# Patient Record
Sex: MALE | Race: WHITE | NOT HISPANIC OR LATINO | Employment: OTHER | URBAN - METROPOLITAN AREA
[De-identification: names, ages, dates, MRNs, and addresses within clinical notes are randomized per-mention and may not be internally consistent; named-entity substitution may affect disease eponyms.]

---

## 2023-03-14 DIAGNOSIS — M06.9 RHEUMATOID ARTHRITIS, INVOLVING UNSPECIFIED SITE, UNSPECIFIED WHETHER RHEUMATOID FACTOR PRESENT (MULTI): Primary | ICD-10-CM

## 2023-03-14 DIAGNOSIS — M15.9 OSTEOARTHRITIS OF MULTIPLE JOINTS, UNSPECIFIED OSTEOARTHRITIS TYPE: ICD-10-CM

## 2023-03-14 DIAGNOSIS — M19.079 ANKLE ARTHRITIS: ICD-10-CM

## 2023-03-14 PROBLEM — E66.3 OVERWEIGHT WITH BODY MASS INDEX (BMI) 25.0-29.9: Status: RESOLVED | Noted: 2023-03-14 | Resolved: 2023-03-14

## 2023-03-14 PROBLEM — K11.20 SIALADENITIS: Status: ACTIVE | Noted: 2023-03-14

## 2023-03-14 PROBLEM — D84.9 IMMUNOCOMPROMISED PATIENT (MULTI): Status: ACTIVE | Noted: 2023-03-14

## 2023-03-14 PROBLEM — G47.00 INSOMNIA: Status: ACTIVE | Noted: 2023-03-14

## 2023-03-14 PROBLEM — N52.9 MALE ERECTILE DISORDER OF ORGANIC ORIGIN: Status: ACTIVE | Noted: 2023-03-14

## 2023-03-14 PROBLEM — M11.20 CPDD (CALCIUM PYROPHOSPHATE DEPOSITION DISEASE): Status: ACTIVE | Noted: 2023-03-14

## 2023-03-14 PROBLEM — M19.039 WRIST ARTHRITIS: Status: ACTIVE | Noted: 2023-03-14

## 2023-03-14 PROBLEM — G25.81 RESTLESS LEGS SYNDROME: Status: ACTIVE | Noted: 2023-03-14

## 2023-03-14 PROBLEM — Z79.52 CURRENT CHRONIC USE OF SYSTEMIC STEROIDS: Status: ACTIVE | Noted: 2023-03-14

## 2023-03-14 PROBLEM — R06.02 SHORTNESS OF BREATH AT REST: Status: ACTIVE | Noted: 2023-03-14

## 2023-03-14 PROBLEM — R79.9 ABNORMAL BLOOD CHEMISTRY: Status: ACTIVE | Noted: 2023-03-14

## 2023-03-14 PROBLEM — R76.12 REACTION TO QUANTIFERON-TB TEST: Status: ACTIVE | Noted: 2023-03-14

## 2023-03-14 PROBLEM — I10 HTN (HYPERTENSION): Status: ACTIVE | Noted: 2023-03-14

## 2023-03-14 PROBLEM — C61 PROSTATE CANCER (MULTI): Status: ACTIVE | Noted: 2023-03-14

## 2023-03-14 PROBLEM — F17.210 NICOTINE DEPENDENCE, CIGARETTES, UNCOMPLICATED: Status: ACTIVE | Noted: 2023-03-14

## 2023-03-14 RX ORDER — ESCITALOPRAM OXALATE 10 MG/1
1 TABLET ORAL DAILY
COMMUNITY
Start: 2023-02-15 | End: 2023-10-13 | Stop reason: ALTCHOICE

## 2023-03-14 RX ORDER — ABATACEPT 50 MG/.4ML
INJECTION, SOLUTION SUBCUTANEOUS
COMMUNITY
Start: 2021-12-07 | End: 2023-10-13 | Stop reason: ALTCHOICE

## 2023-03-14 RX ORDER — METHOTREXATE 2.5 MG/1
TABLET ORAL
COMMUNITY
End: 2023-03-14 | Stop reason: SDUPTHER

## 2023-03-14 RX ORDER — METHOTREXATE 2.5 MG/1
TABLET ORAL
Qty: 40 TABLET | Refills: 0 | Status: SHIPPED | OUTPATIENT
Start: 2023-03-14 | End: 2023-04-17

## 2023-03-14 RX ORDER — FOLIC ACID 1 MG/1
1 TABLET ORAL DAILY
COMMUNITY
Start: 2017-09-18

## 2023-03-14 RX ORDER — MULTIVIT-MIN/IRON/FOLIC ACID/K 18-600-40
1 CAPSULE ORAL DAILY
COMMUNITY
Start: 2021-05-04

## 2023-03-14 RX ORDER — NABUMETONE 750 MG/1
1 TABLET, FILM COATED ORAL 2 TIMES DAILY
COMMUNITY
Start: 2023-02-13 | End: 2024-04-12 | Stop reason: ALTCHOICE

## 2023-04-17 DIAGNOSIS — M06.9 RHEUMATOID ARTHRITIS, INVOLVING UNSPECIFIED SITE, UNSPECIFIED WHETHER RHEUMATOID FACTOR PRESENT (MULTI): ICD-10-CM

## 2023-04-17 RX ORDER — METHOTREXATE 2.5 MG/1
TABLET ORAL
Qty: 40 TABLET | Refills: 0 | Status: SHIPPED | OUTPATIENT
Start: 2023-04-17 | End: 2023-05-30

## 2023-05-09 LAB
ALANINE AMINOTRANSFERASE (SGPT) (U/L) IN SER/PLAS: 30 U/L (ref 10–52)
ALBUMIN (G/DL) IN SER/PLAS: 3.8 G/DL (ref 3.4–5)
ALKALINE PHOSPHATASE (U/L) IN SER/PLAS: 112 U/L (ref 33–136)
ASPARTATE AMINOTRANSFERASE (SGOT) (U/L) IN SER/PLAS: 28 U/L (ref 9–39)
BASOPHILS (10*3/UL) IN BLOOD BY AUTOMATED COUNT: 0.03 X10E9/L (ref 0–0.1)
BASOPHILS/100 LEUKOCYTES IN BLOOD BY AUTOMATED COUNT: 0.8 % (ref 0–2)
BILIRUBIN DIRECT (MG/DL) IN SER/PLAS: 0.1 MG/DL (ref 0–0.3)
BILIRUBIN TOTAL (MG/DL) IN SER/PLAS: 0.5 MG/DL (ref 0–1.2)
C REACTIVE PROTEIN (MG/L) IN SER/PLAS: 0.19 MG/DL
CHOLESTEROL (MG/DL) IN SER/PLAS: 176 MG/DL (ref 0–199)
CHOLESTEROL IN HDL (MG/DL) IN SER/PLAS: 62.4 MG/DL
CHOLESTEROL/HDL RATIO: 2.8
CREATININE (MG/DL) IN SER/PLAS: 0.97 MG/DL (ref 0.5–1.3)
EOSINOPHILS (10*3/UL) IN BLOOD BY AUTOMATED COUNT: 0.08 X10E9/L (ref 0–0.7)
EOSINOPHILS/100 LEUKOCYTES IN BLOOD BY AUTOMATED COUNT: 2 % (ref 0–6)
ERYTHROCYTE DISTRIBUTION WIDTH (RATIO) BY AUTOMATED COUNT: 15.2 % (ref 11.5–14.5)
ERYTHROCYTE MEAN CORPUSCULAR HEMOGLOBIN CONCENTRATION (G/DL) BY AUTOMATED: 33.1 G/DL (ref 32–36)
ERYTHROCYTE MEAN CORPUSCULAR VOLUME (FL) BY AUTOMATED COUNT: 94 FL (ref 80–100)
ERYTHROCYTES (10*6/UL) IN BLOOD BY AUTOMATED COUNT: 4.49 X10E12/L (ref 4.5–5.9)
GFR MALE: 85 ML/MIN/1.73M2
HEMATOCRIT (%) IN BLOOD BY AUTOMATED COUNT: 42.3 % (ref 41–52)
HEMOGLOBIN (G/DL) IN BLOOD: 14 G/DL (ref 13.5–17.5)
IMMATURE GRANULOCYTES/100 LEUKOCYTES IN BLOOD BY AUTOMATED COUNT: 0.8 % (ref 0–0.9)
LDL: 100 MG/DL (ref 0–99)
LEUKOCYTES (10*3/UL) IN BLOOD BY AUTOMATED COUNT: 3.9 X10E9/L (ref 4.4–11.3)
LYMPHOCYTES (10*3/UL) IN BLOOD BY AUTOMATED COUNT: 1.38 X10E9/L (ref 1.2–4.8)
LYMPHOCYTES/100 LEUKOCYTES IN BLOOD BY AUTOMATED COUNT: 35 % (ref 13–44)
MONOCYTES (10*3/UL) IN BLOOD BY AUTOMATED COUNT: 0.39 X10E9/L (ref 0.1–1)
MONOCYTES/100 LEUKOCYTES IN BLOOD BY AUTOMATED COUNT: 9.9 % (ref 2–10)
NEUTROPHILS (10*3/UL) IN BLOOD BY AUTOMATED COUNT: 2.03 X10E9/L (ref 1.2–7.7)
NEUTROPHILS/100 LEUKOCYTES IN BLOOD BY AUTOMATED COUNT: 51.5 % (ref 40–80)
NRBC (PER 100 WBCS) BY AUTOMATED COUNT: 0 /100 WBC (ref 0–0)
PLATELETS (10*3/UL) IN BLOOD AUTOMATED COUNT: 82 X10E9/L (ref 150–450)
PROTEIN TOTAL: 6.9 G/DL (ref 6.4–8.2)
TESTOSTERONE (NG/DL) IN SER/PLAS: 1258 NG/DL (ref 240–1000)
THYROTROPIN (MIU/L) IN SER/PLAS BY DETECTION LIMIT <= 0.05 MIU/L: 1.69 MIU/L (ref 0.44–3.98)
TRIGLYCERIDE (MG/DL) IN SER/PLAS: 69 MG/DL (ref 0–149)
VLDL: 14 MG/DL (ref 0–40)

## 2023-05-11 PROBLEM — G31.84 MILD COGNITIVE IMPAIRMENT: Status: ACTIVE | Noted: 2023-05-11

## 2023-05-11 PROBLEM — Z00.00 MEDICARE ANNUAL WELLNESS VISIT, SUBSEQUENT: Status: ACTIVE | Noted: 2023-05-11

## 2023-05-29 DIAGNOSIS — M06.9 RHEUMATOID ARTHRITIS, INVOLVING UNSPECIFIED SITE, UNSPECIFIED WHETHER RHEUMATOID FACTOR PRESENT (MULTI): ICD-10-CM

## 2023-05-30 LAB
BURR CELLS PRESENCE IN BLOOD BY LIGHT MICROSCOPY: NORMAL
ERYTHROCYTE DISTRIBUTION WIDTH (RATIO) BY AUTOMATED COUNT: 14.7 % (ref 11.5–14.5)
ERYTHROCYTE MEAN CORPUSCULAR HEMOGLOBIN CONCENTRATION (G/DL) BY AUTOMATED: 32.5 G/DL (ref 32–36)
ERYTHROCYTE MEAN CORPUSCULAR VOLUME (FL) BY AUTOMATED COUNT: 96 FL (ref 80–100)
ERYTHROCYTES (10*6/UL) IN BLOOD BY AUTOMATED COUNT: 4.5 X10E12/L (ref 4.5–5.9)
HEMATOCRIT (%) IN BLOOD BY AUTOMATED COUNT: 43.4 % (ref 41–52)
HEMOGLOBIN (G/DL) IN BLOOD: 14.1 G/DL (ref 13.5–17.5)
LEUKOCYTES (10*3/UL) IN BLOOD BY AUTOMATED COUNT: 7.1 X10E9/L (ref 4.4–11.3)
PLATELETS (10*3/UL) IN BLOOD AUTOMATED COUNT: 130 X10E9/L (ref 150–450)
POLYCHROMASIA IN BLOOD BY LIGHT MICROSCOPY: NORMAL
RBC MORPHOLOGY IN BLOOD: NORMAL
SCHISTOCYTES (PRESENCE) IN BLOOD BY LIGHT MICROSCOPY: NORMAL

## 2023-05-30 RX ORDER — METHOTREXATE 2.5 MG/1
TABLET ORAL
Qty: 40 TABLET | Refills: 3 | Status: SHIPPED | OUTPATIENT
Start: 2023-05-30 | End: 2024-05-08 | Stop reason: SDUPTHER

## 2023-06-13 PROBLEM — R79.9 ABNORMAL BLOOD CHEMISTRY: Status: RESOLVED | Noted: 2023-03-14 | Resolved: 2023-06-13

## 2023-06-13 PROBLEM — R41.3 MEMORY LOSS: Status: RESOLVED | Noted: 2023-06-13 | Resolved: 2023-06-13

## 2023-06-13 PROBLEM — M25.531 RIGHT WRIST PAIN: Status: RESOLVED | Noted: 2023-06-13 | Resolved: 2023-06-13

## 2023-06-13 PROBLEM — R27.0 ATAXIA: Status: RESOLVED | Noted: 2023-06-13 | Resolved: 2023-06-13

## 2023-06-13 PROBLEM — G45.9 TRANSIENT ISCHEMIC ATTACK: Status: RESOLVED | Noted: 2023-06-13 | Resolved: 2023-06-13

## 2023-10-13 ENCOUNTER — OFFICE VISIT (OUTPATIENT)
Dept: PRIMARY CARE | Facility: CLINIC | Age: 68
End: 2023-10-13
Payer: MEDICARE

## 2023-10-13 VITALS
HEART RATE: 62 BPM | BODY MASS INDEX: 33.99 KG/M2 | SYSTOLIC BLOOD PRESSURE: 102 MMHG | WEIGHT: 217 LBS | OXYGEN SATURATION: 94 % | DIASTOLIC BLOOD PRESSURE: 68 MMHG | RESPIRATION RATE: 16 BRPM | TEMPERATURE: 98.2 F

## 2023-10-13 DIAGNOSIS — Z23 NEED FOR VACCINATION: ICD-10-CM

## 2023-10-13 DIAGNOSIS — K21.00 GASTROESOPHAGEAL REFLUX DISEASE WITH ESOPHAGITIS WITHOUT HEMORRHAGE: Primary | ICD-10-CM

## 2023-10-13 DIAGNOSIS — J32.9 RHINOSINUSITIS: ICD-10-CM

## 2023-10-13 PROCEDURE — G0008 ADMIN INFLUENZA VIRUS VAC: HCPCS | Performed by: FAMILY MEDICINE

## 2023-10-13 PROCEDURE — 4004F PT TOBACCO SCREEN RCVD TLK: CPT | Performed by: FAMILY MEDICINE

## 2023-10-13 PROCEDURE — 90662 IIV NO PRSV INCREASED AG IM: CPT | Performed by: FAMILY MEDICINE

## 2023-10-13 PROCEDURE — 1126F AMNT PAIN NOTED NONE PRSNT: CPT | Performed by: FAMILY MEDICINE

## 2023-10-13 PROCEDURE — 99214 OFFICE O/P EST MOD 30 MIN: CPT | Performed by: FAMILY MEDICINE

## 2023-10-13 PROCEDURE — 3078F DIAST BP <80 MM HG: CPT | Performed by: FAMILY MEDICINE

## 2023-10-13 PROCEDURE — 3074F SYST BP LT 130 MM HG: CPT | Performed by: FAMILY MEDICINE

## 2023-10-13 PROCEDURE — 1159F MED LIST DOCD IN RCRD: CPT | Performed by: FAMILY MEDICINE

## 2023-10-13 RX ORDER — FOLIC ACID 1 MG/1
1 TABLET ORAL EVERY 24 HOURS
COMMUNITY
Start: 2017-09-18 | End: 2023-10-13 | Stop reason: ALTCHOICE

## 2023-10-13 RX ORDER — HYDROXYCHLOROQUINE SULFATE 200 MG/1
200 TABLET, FILM COATED ORAL 2 TIMES DAILY
COMMUNITY
Start: 2023-06-13

## 2023-10-13 RX ORDER — TRAZODONE HYDROCHLORIDE 50 MG/1
50 TABLET ORAL NIGHTLY
COMMUNITY

## 2023-10-13 RX ORDER — OMEPRAZOLE 40 MG/1
40 CAPSULE, DELAYED RELEASE ORAL
Qty: 30 CAPSULE | Refills: 1 | Status: SHIPPED | OUTPATIENT
Start: 2023-10-13 | End: 2023-12-11

## 2023-10-13 RX ORDER — FLUTICASONE PROPIONATE 50 MCG
1 SPRAY, SUSPENSION (ML) NASAL 2 TIMES DAILY
Qty: 48 G | Refills: 2 | Status: SHIPPED | OUTPATIENT
Start: 2023-10-13 | End: 2024-10-12

## 2023-10-13 RX ORDER — IPRATROPIUM BROMIDE 42 UG/1
1 SPRAY, METERED NASAL NIGHTLY PRN
Qty: 15 ML | Refills: 2 | Status: SHIPPED | OUTPATIENT
Start: 2023-10-13 | End: 2024-10-12

## 2023-10-13 ASSESSMENT — ENCOUNTER SYMPTOMS
OCCASIONAL FEELINGS OF UNSTEADINESS: 0
LOSS OF SENSATION IN FEET: 0
DEPRESSION: 0

## 2023-10-13 NOTE — PROGRESS NOTES
"Subjective   Patient ID: Dipesh Rousseau is a 68 y.o. male who presents for GI Problem (Acid Reflux, stomach tenderness 3out 10 pain).    Started 10/11/23. No blood.  But having increased heartburn for the past months.  Tums and norm seltzer.       1/4 ppd tobacco.       Objective   Visit Vitals  /68 (BP Location: Right arm, Patient Position: Sitting, BP Cuff Size: Adult)   Pulse 62   Temp 36.8 °C (98.2 °F) (Temporal)   Resp 16   Wt 98.4 kg (217 lb)   SpO2 94%   BMI 33.99 kg/m²   Smoking Status Every Day   BSA 2.16 m²      O: VSS AFEB Awake, Alert, NAD.  No intoxication, withdrawal, or sedation.  Chest CTA.  Heart RRR.  Ext no c/c/e.   No epigastric ttp.     Lab Results   Component Value Date    WBC 7.1 05/30/2023    HGB 14.1 05/30/2023    HCT 43.4 05/30/2023    MCV 96 05/30/2023     (L) 05/30/2023       Chemistry    Lab Results   Component Value Date/Time     (L) 11/18/2022 1133    K 4.1 11/18/2022 1133     11/18/2022 1133    CO2 25 11/18/2022 1133    BUN 18 11/18/2022 1133    CREATININE 0.97 05/09/2023 0943    Lab Results   Component Value Date/Time    CALCIUM 8.5 (L) 11/18/2022 1133    ALKPHOS 112 05/09/2023 0943    AST 28 05/09/2023 0943    ALT 30 05/09/2023 0943    BILITOT 0.5 05/09/2023 0943          Lab Results   Component Value Date    CHOL 176 05/09/2023     Lab Results   Component Value Date    HDL 62.4 05/09/2023     No results found for: \"LDLCALC\"  Lab Results   Component Value Date    TRIG 69 05/09/2023     No components found for: \"CHOLHDL\"   No results found for: \"HGBA1C\"    Assessment/Plan   Problem List Items Addressed This Visit       Gastroesophageal reflux disease with esophagitis without hemorrhage - Primary    Overview     10/13/23 handout provided to patient and wife.  Refer for EGD.   Start omeprazole 40 mg daily.   Halve nabumetone x 6-8 weeks until we get relief.          Relevant Medications    omeprazole (PriLOSEC) 40 mg DR capsule     Other Visit Diagnoses  "      Rhinosinusitis        Relevant Medications    fluticasone (Flonase) 50 mcg/actuation nasal spray    ipratropium (Atrovent) 42 mcg (0.06 %) nasal spray    Need for vaccination        Relevant Orders    Flu vaccine, quadrivalent, high-dose, preservative free, age 65y+ (FLUZONE)

## 2023-12-11 DIAGNOSIS — K21.00 GASTROESOPHAGEAL REFLUX DISEASE WITH ESOPHAGITIS WITHOUT HEMORRHAGE: ICD-10-CM

## 2023-12-11 RX ORDER — OMEPRAZOLE 40 MG/1
40 CAPSULE, DELAYED RELEASE ORAL
Qty: 30 CAPSULE | Refills: 11 | Status: SHIPPED | OUTPATIENT
Start: 2023-12-11

## 2023-12-21 ENCOUNTER — TELEPHONE (OUTPATIENT)
Dept: PRIMARY CARE | Facility: CLINIC | Age: 68
End: 2023-12-21
Payer: MEDICARE

## 2023-12-21 NOTE — TELEPHONE ENCOUNTER
Wife calling for radha, URI/Sinus concern. Dr. Bishop without openingings. Offered Dr. Wisdom, but he requires covid testing prior to in office with with these symptoms, declined to get OTC testing. Offered to send message to Dr. Bishop for next step, but made them aware, he was not in office until next day. Wife declined sending message, states she will take him to an urgent care or [facility].

## 2024-04-12 ENCOUNTER — OFFICE VISIT (OUTPATIENT)
Dept: PRIMARY CARE | Facility: CLINIC | Age: 69
End: 2024-04-12
Payer: MEDICARE

## 2024-04-12 VITALS
DIASTOLIC BLOOD PRESSURE: 70 MMHG | SYSTOLIC BLOOD PRESSURE: 130 MMHG | WEIGHT: 224 LBS | OXYGEN SATURATION: 97 % | HEART RATE: 59 BPM | TEMPERATURE: 97.9 F | RESPIRATION RATE: 16 BRPM | BODY MASS INDEX: 35.08 KG/M2

## 2024-04-12 DIAGNOSIS — M05.79 RHEUMATOID ARTHRITIS INVOLVING MULTIPLE SITES WITH POSITIVE RHEUMATOID FACTOR (MULTI): Primary | ICD-10-CM

## 2024-04-12 DIAGNOSIS — G31.84 MILD COGNITIVE IMPAIRMENT: ICD-10-CM

## 2024-04-12 DIAGNOSIS — I10 PRIMARY HYPERTENSION: ICD-10-CM

## 2024-04-12 DIAGNOSIS — Z23 NEED FOR VACCINATION: ICD-10-CM

## 2024-04-12 DIAGNOSIS — F17.210 NICOTINE DEPENDENCE, CIGARETTES, UNCOMPLICATED: ICD-10-CM

## 2024-04-12 DIAGNOSIS — Z00.00 MEDICARE ANNUAL WELLNESS VISIT, SUBSEQUENT: ICD-10-CM

## 2024-04-12 PROBLEM — K11.20 SIALADENITIS: Status: RESOLVED | Noted: 2023-03-14 | Resolved: 2024-04-12

## 2024-04-12 PROBLEM — Z79.52 CURRENT CHRONIC USE OF SYSTEMIC STEROIDS: Status: RESOLVED | Noted: 2023-03-14 | Resolved: 2024-04-12

## 2024-04-12 PROBLEM — K76.0 HEPATIC STEATOSIS: Status: ACTIVE | Noted: 2024-04-12

## 2024-04-12 PROCEDURE — 3078F DIAST BP <80 MM HG: CPT | Performed by: FAMILY MEDICINE

## 2024-04-12 PROCEDURE — 1159F MED LIST DOCD IN RCRD: CPT | Performed by: FAMILY MEDICINE

## 2024-04-12 PROCEDURE — 1170F FXNL STATUS ASSESSED: CPT | Performed by: FAMILY MEDICINE

## 2024-04-12 PROCEDURE — 90677 PCV20 VACCINE IM: CPT | Performed by: FAMILY MEDICINE

## 2024-04-12 PROCEDURE — 1123F ACP DISCUSS/DSCN MKR DOCD: CPT | Performed by: FAMILY MEDICINE

## 2024-04-12 PROCEDURE — 1158F ADVNC CARE PLAN TLK DOCD: CPT | Performed by: FAMILY MEDICINE

## 2024-04-12 PROCEDURE — G0439 PPPS, SUBSEQ VISIT: HCPCS | Performed by: FAMILY MEDICINE

## 2024-04-12 PROCEDURE — 3075F SYST BP GE 130 - 139MM HG: CPT | Performed by: FAMILY MEDICINE

## 2024-04-12 PROCEDURE — 99397 PER PM REEVAL EST PAT 65+ YR: CPT | Performed by: FAMILY MEDICINE

## 2024-04-12 PROCEDURE — 99406 BEHAV CHNG SMOKING 3-10 MIN: CPT | Performed by: FAMILY MEDICINE

## 2024-04-12 PROCEDURE — G0446 INTENS BEHAVE THER CARDIO DX: HCPCS | Performed by: FAMILY MEDICINE

## 2024-04-12 PROCEDURE — G0009 ADMIN PNEUMOCOCCAL VACCINE: HCPCS | Performed by: FAMILY MEDICINE

## 2024-04-12 PROCEDURE — 1160F RVW MEDS BY RX/DR IN RCRD: CPT | Performed by: FAMILY MEDICINE

## 2024-04-12 PROCEDURE — 99214 OFFICE O/P EST MOD 30 MIN: CPT | Performed by: FAMILY MEDICINE

## 2024-04-12 RX ORDER — NABUMETONE 750 MG/1
750 TABLET, FILM COATED ORAL 2 TIMES DAILY
Start: 2024-04-12 | End: 2025-04-12

## 2024-04-12 ASSESSMENT — ENCOUNTER SYMPTOMS
COUGH: 0
BLOOD IN STOOL: 0
PALPITATIONS: 0
FEVER: 0
HEMATURIA: 0
POLYDIPSIA: 0
NECK STIFFNESS: 0
HEADACHES: 0
BACK PAIN: 0
BRUISES/BLEEDS EASILY: 0
ADENOPATHY: 0
DYSURIA: 0
SORE THROAT: 0
EYE REDNESS: 0
WOUND: 0
RHINORRHEA: 0
EYE PAIN: 0
SHORTNESS OF BREATH: 0
CHILLS: 0
CONFUSION: 1
FATIGUE: 0
ARTHRALGIAS: 1
HALLUCINATIONS: 0
ABDOMINAL PAIN: 0

## 2024-04-12 ASSESSMENT — ACTIVITIES OF DAILY LIVING (ADL)
MANAGING_FINANCES: INDEPENDENT
TAKING_MEDICATION: INDEPENDENT
DRESSING: INDEPENDENT
GROCERY_SHOPPING: INDEPENDENT
DOING_HOUSEWORK: INDEPENDENT
BATHING: INDEPENDENT

## 2024-04-12 ASSESSMENT — PATIENT HEALTH QUESTIONNAIRE - PHQ9
1. LITTLE INTEREST OR PLEASURE IN DOING THINGS: NOT AT ALL
2. FEELING DOWN, DEPRESSED OR HOPELESS: NOT AT ALL
SUM OF ALL RESPONSES TO PHQ9 QUESTIONS 1 AND 2: 0

## 2024-04-12 NOTE — ASSESSMENT & PLAN NOTE
Per US 3/2024 at [facility]  Encouraged continued healthy diet -- initial goal to lose 10%, 25# to get down to 200#.

## 2024-04-12 NOTE — ASSESSMENT & PLAN NOTE
Hx Weakness/ataxia/confusion: No further episodse since fall 2022. Brain MRI normal. Labs normal except for transient elevation of CRP/ESR which have resolved. Neuro Dr Mcneal suspects TIA -- MRA, echo, zio monitor and eeg pending. Neuropsychologic testing with Dr Hughes revealed only MCI SLUMS 25/30 and suspected depression.     Fatigue and MCI: slowly improving over past 6 months -- able to work 3 days in a row now. Suspect that this is due to a stress reaction/MDD as diagnosed by Dr Hughes -- treat with lexapro (escitalopram) 10 mg qpm. If this relieves his symptoms then can forego further cardio/neurologic testing. Can check TSH and testosterone with next bloodwork.

## 2024-04-12 NOTE — PROGRESS NOTES
"Subjective   Patient ID: Dipesh Rousseau is a 69 y.o. male who presents for Follow-up (Urgent care).  And Medical [place] preventive exam and annual medicare wellness eval.     [facility] visit -- pain in left lateral rib.  Started on left flank and radiated to left lateral rib.   No cough, no sob.  No hematuria.      Had US showing steatosis and a single renal cyst.     Objective   Visit Vitals  /70 (BP Location: Left arm, Patient Position: Sitting, BP Cuff Size: Adult)   Pulse 59   Temp 36.6 °C (97.9 °F) (Temporal)   Resp 16   Wt 102 kg (224 lb)   SpO2 97%   BMI 35.08 kg/m²   Smoking Status Every Day   BSA 2.2 m²      O: VSS AFEB Awake, Alert, NAD.  No intoxication, withdrawal, or sedation.  Chest CTA.  Heart RRR.  Ext no c/c/e.    Healed vesicular lesion around left flank c/w with healed mild shingles.  Mild left lateral costocoondral margin.  No rebound or guarding.  Negative cva ttp.     Lab Results   Component Value Date    WBC 7.1 05/30/2023    HGB 14.1 05/30/2023    HCT 43.4 05/30/2023    MCV 96 05/30/2023     (L) 05/30/2023       Chemistry    Lab Results   Component Value Date/Time     (L) 11/18/2022 1133    K 4.1 11/18/2022 1133     11/18/2022 1133    CO2 25 11/18/2022 1133    BUN 18 11/18/2022 1133    CREATININE 0.97 05/09/2023 0943    Lab Results   Component Value Date/Time    CALCIUM 8.5 (L) 11/18/2022 1133    ALKPHOS 112 05/09/2023 0943    AST 28 05/09/2023 0943    ALT 30 05/09/2023 0943    BILITOT 0.5 05/09/2023 0943          Lab Results   Component Value Date    CHOL 176 05/09/2023     Lab Results   Component Value Date    HDL 62.4 05/09/2023     No results found for: \"LDLCALC\"  Lab Results   Component Value Date    TRIG 69 05/09/2023     No components found for: \"CHOLHDL\"   No results found for: \"HGBA1C\"    Assessment/Plan   Problem List Items Addressed This Visit       Nicotine dependence, cigarettes, uncomplicated    Overview     3 cigarettes a day.  " Pre-contemplative         Rheumatoid arthritis (Multi) - Primary    Overview     Dx 2019  Failed Humira (LFTs)  Failed Orencia (Fatigue)  12/13/23 [facility] RHEUM (Ray)  Plaquenil (HQN),  methotrexate down to 15 mg weekly.  Avoid Siria's d/t hx smoking.  Tolerating Rituximab    10/13/23 halve nabumetone due to gastritis/esophagitis         Medicare annual wellness visit, subsequent    Overview     4/12/24 Annual MM preventive exam performed  4/12/24 subsequent medicare wellness eval performed           Current Assessment & Plan     4/12/24 Annual MM Preventative exam -- overweight -- counseled on  weight loss tips of healthy diet cut 250 calories out per day and regular exercise (150 minutes a week). Colonoscopy clear 2022 -- next due 2032. Low Dose CT lung screen.   Due for Prevnar 20 4/12/24.    4/12/24 Annual medicare wellness eval performed:  Cognitive impairment stable, no depression, no alcohol use.  Screening tests up to date.         4/12/24 counseled on smoking cessation: Smoking <1/2 ppd -- currently pre-contemplative, wife is contemplative.     No regular alcohol use -- at most 2 beers a week.    4/12/24 I spent 15 minutes face-to-face with this individual discussing their cardiovascular risk and behavioral therapies of nutritional choices, exercise, and elimination of habits contributing to risk.  We agreed on a plan addressing reduction of their current cardiovascular risk.  Patient's 10 year CV risk estimate calculates to:  17.2 % ()  Declines statin.     Insomnia -- c/o early morning awakening. RLS seems to have resolved. no better with trial of flexeril. If no better or if breathing becomes more of an issue can send for sleep study.          Mild cognitive impairment    Current Assessment & Plan     Hx Weakness/ataxia/confusion: No further episodse since fall 2022. Brain MRI normal. Labs normal except for transient elevation of CRP/ESR which have resolved. Neuro Dr Mcneal suspects TIA -- MRA, echo,  zio monitor and eeg pending. Neuropsychologic testing with Dr Hughes revealed only MCI SLUMS 25/30 and suspected depression.     Fatigue and MCI: slowly improving over past 6 months -- able to work 3 days in a row now. Suspect that this is due to a stress reaction/MDD as diagnosed by Dr Hughes -- treat with lexapro (escitalopram) 10 mg qpm. If this relieves his symptoms then can forego further cardio/neurologic testing. Can check TSH and testosterone with next bloodwork.          RESOLVED: HTN (hypertension)    Overview     Elevated BP improved with diet and exercise.  Never started losartan.            Subjective   Reason for Visit: Dipesh Rousseau is an 69 y.o. male here for a Medicare Wellness visit.     Past Medical, Surgical, and Family History reviewed and updated in chart.    Reviewed all medications by prescribing practitioner or clinical pharmacist (such as prescriptions, OTCs, herbal therapies and supplements) and documented in the medical record.        Patient Care Team:  Hernandez Bishop MD as PCP - General (Family Medicine)     Review of Systems   Constitutional:  Negative for chills, fatigue and fever.   HENT:  Negative for rhinorrhea and sore throat.    Eyes:  Negative for pain and redness.   Respiratory:  Negative for cough and shortness of breath.    Cardiovascular:  Negative for chest pain and palpitations.   Gastrointestinal:  Negative for abdominal pain and blood in stool.   Endocrine: Negative for polydipsia and polyuria.   Genitourinary:  Negative for dysuria and hematuria.   Musculoskeletal:  Positive for arthralgias. Negative for back pain and neck stiffness.   Skin:  Negative for rash and wound.   Allergic/Immunologic: Negative for environmental allergies and food allergies.   Neurological:  Negative for headaches.   Hematological:  Negative for adenopathy. Does not bruise/bleed easily.   Psychiatric/Behavioral:  Positive for confusion. Negative for hallucinations and suicidal ideas.         Objective   Vitals:  /70 (BP Location: Left arm, Patient Position: Sitting, BP Cuff Size: Adult)   Pulse 59   Temp 36.6 °C (97.9 °F) (Temporal)   Resp 16   Wt 102 kg (224 lb)   SpO2 97%   BMI 35.08 kg/m²       Physical Exam  Vitals reviewed.   Constitutional:       General: He is not in acute distress.     Appearance: He is not ill-appearing.   HENT:      Head: Normocephalic and atraumatic.      Right Ear: Tympanic membrane normal.      Left Ear: Tympanic membrane normal.      Nose: No congestion or rhinorrhea.      Mouth/Throat:      Pharynx: No oropharyngeal exudate or posterior oropharyngeal erythema.   Eyes:      Extraocular Movements: Extraocular movements intact.      Conjunctiva/sclera: Conjunctivae normal.      Pupils: Pupils are equal, round, and reactive to light.   Cardiovascular:      Rate and Rhythm: Normal rate and regular rhythm.      Heart sounds: No murmur heard.     No friction rub. No gallop.   Pulmonary:      Effort: Pulmonary effort is normal.      Breath sounds: Normal breath sounds. No wheezing, rhonchi or rales.   Abdominal:      General: There is no distension.      Palpations: Abdomen is soft.      Tenderness: There is no abdominal tenderness. There is no guarding or rebound.   Musculoskeletal:         General: No swelling or deformity.      Cervical back: Normal range of motion and neck supple.      Right lower leg: No edema.      Left lower leg: No edema.   Skin:     Capillary Refill: Capillary refill takes less than 2 seconds.      Coloration: Skin is not jaundiced.      Findings: Rash present.   Neurological:      General: No focal deficit present.      Mental Status: He is alert.      Motor: No weakness.   Psychiatric:         Mood and Affect: Mood normal.         Behavior: Behavior normal.         Assessment/Plan   Problem List Items Addressed This Visit       Nicotine dependence, cigarettes, uncomplicated    Overview     3 cigarettes a day.  Pre-contemplative          Rheumatoid arthritis (Multi) - Primary    Overview     Dx 2019  Failed Humira (LFTs)  Failed Orencia (Fatigue)  12/13/23 [facility] RHEUM (Ray)  Plaquenil (HQN),  methotrexate down to 15 mg weekly.  Avoid Siria's d/t hx smoking.  Tolerating Rituximab    10/13/23 halve nabumetone due to gastritis/esophagitis         Medicare annual wellness visit, subsequent    Overview     4/12/24 Annual MM preventive exam performed  4/12/24 subsequent medicare wellness eval performed           Current Assessment & Plan     4/12/24 Annual MM Preventative exam -- overweight -- counseled on  weight loss tips of healthy diet cut 250 calories out per day and regular exercise (150 minutes a week). Colonoscopy clear 2022 -- next due 2032. Low Dose CT lung screen.   Due for Prevnar 20 4/12/24.    4/12/24 Annual medicare wellness eval performed:  Cognitive impairment stable, no depression, no alcohol use.  Screening tests up to date.         4/12/24 counseled on smoking cessation: Smoking <1/2 ppd -- currently pre-contemplative, wife is contemplative.     No regular alcohol use -- at most 2 beers a week.    4/12/24 I spent 15 minutes face-to-face with this individual discussing their cardiovascular risk and behavioral therapies of nutritional choices, exercise, and elimination of habits contributing to risk.  We agreed on a plan addressing reduction of their current cardiovascular risk.  Patient's 10 year CV risk estimate calculates to:  17.2 % ()  Declines statin.     Insomnia -- c/o early morning awakening. RLS seems to have resolved. no better with trial of flexeril. If no better or if breathing becomes more of an issue can send for sleep study.          Mild cognitive impairment    Current Assessment & Plan     Hx Weakness/ataxia/confusion: No further episodse since fall 2022. Brain MRI normal. Labs normal except for transient elevation of CRP/ESR which have resolved. Neuro Dr Mcneal suspects TIA -- MRA, echo, zio monitor and eeg  pending. Neuropsychologic testing with Dr Hughes revealed only MCI SLUMS 25/30 and suspected depression.     Fatigue and MCI: slowly improving over past 6 months -- able to work 3 days in a row now. Suspect that this is due to a stress reaction/MDD as diagnosed by Dr Hughes -- treat with lexapro (escitalopram) 10 mg qpm. If this relieves his symptoms then can forego further cardio/neurologic testing. Can check TSH and testosterone with next bloodwork.          RESOLVED: HTN (hypertension)    Overview     Elevated BP improved with diet and exercise.  Never started losartan.

## 2024-04-12 NOTE — ASSESSMENT & PLAN NOTE
4/12/24 Annual  Preventative exam -- overweight -- counseled on [facility] weight loss tips of healthy diet cut 250 calories out per day and regular exercise (150 minutes a week). Colonoscopy clear 2022 -- next due 2032. Low Dose CT lung screen.   Due for Prevnar 20 4/12/24.    4/12/24 Annual medicare wellness eval performed:  Cognitive impairment stable, no depression, no alcohol use.  Screening tests up to date.         4/12/24 counseled on smoking cessation: Smoking <1/2 ppd -- currently pre-contemplative, wife is contemplative.     No regular alcohol use -- at most 2 beers a week.    4/12/24 I spent 15 minutes face-to-face with this individual discussing their cardiovascular risk and behavioral therapies of nutritional choices, exercise, and elimination of habits contributing to risk.  We agreed on a plan addressing reduction of their current cardiovascular risk.  Patient's 10 year CV risk estimate calculates to:  17.2 % ()  Declines statin.     Insomnia -- c/o early morning awakening. RLS seems to have resolved. no better with trial of flexeril. If no better or if breathing becomes more of an issue can send for sleep study.

## 2024-04-20 ENCOUNTER — APPOINTMENT (OUTPATIENT)
Dept: RADIOLOGY | Facility: HOSPITAL | Age: 69
DRG: 190 | End: 2024-04-20
Payer: MEDICARE

## 2024-04-20 ENCOUNTER — APPOINTMENT (OUTPATIENT)
Dept: CARDIOLOGY | Facility: HOSPITAL | Age: 69
DRG: 190 | End: 2024-04-20
Payer: MEDICARE

## 2024-04-20 ENCOUNTER — HOSPITAL ENCOUNTER (INPATIENT)
Facility: HOSPITAL | Age: 69
LOS: 5 days | Discharge: HOME | DRG: 190 | End: 2024-04-25
Attending: STUDENT IN AN ORGANIZED HEALTH CARE EDUCATION/TRAINING PROGRAM | Admitting: INTERNAL MEDICINE
Payer: MEDICARE

## 2024-04-20 DIAGNOSIS — J44.1 COPD EXACERBATION (MULTI): ICD-10-CM

## 2024-04-20 DIAGNOSIS — J96.01 ACUTE RESPIRATORY FAILURE WITH HYPOXIA (MULTI): Primary | ICD-10-CM

## 2024-04-20 LAB
ALBUMIN SERPL-MCNC: 4.3 G/DL (ref 3.5–5)
ALP BLD-CCNC: 110 U/L (ref 35–125)
ALT SERPL-CCNC: 50 U/L (ref 5–40)
ANION GAP BLDA CALCULATED.4IONS-SCNC: 11 MMO/L (ref 10–25)
ANION GAP SERPL CALC-SCNC: 11 MMOL/L
APPARATUS: ABNORMAL
ARTERIAL PATENCY WRIST A: POSITIVE
AST SERPL-CCNC: 48 U/L (ref 5–40)
BASE EXCESS BLDA CALC-SCNC: 1.8 MMOL/L (ref -2–3)
BASOPHILS # BLD AUTO: 0.03 X10*3/UL (ref 0–0.1)
BASOPHILS NFR BLD AUTO: 0.2 %
BILIRUB SERPL-MCNC: 0.5 MG/DL (ref 0.1–1.2)
BODY TEMPERATURE: 37 DEGREES CELSIUS
BUN SERPL-MCNC: 14 MG/DL (ref 8–25)
CA-I BLDA-SCNC: 1.09 MMOL/L (ref 1.1–1.33)
CALCIUM SERPL-MCNC: 8.8 MG/DL (ref 8.5–10.4)
CHLORIDE BLDA-SCNC: 103 MMOL/L (ref 98–107)
CHLORIDE SERPL-SCNC: 102 MMOL/L (ref 97–107)
CO2 SERPL-SCNC: 25 MMOL/L (ref 24–31)
CREAT SERPL-MCNC: 1 MG/DL (ref 0.4–1.6)
EGFRCR SERPLBLD CKD-EPI 2021: 82 ML/MIN/1.73M*2
EOSINOPHIL # BLD AUTO: 0 X10*3/UL (ref 0–0.7)
EOSINOPHIL NFR BLD AUTO: 0 %
ERYTHROCYTE [DISTWIDTH] IN BLOOD BY AUTOMATED COUNT: 14.6 % (ref 11.5–14.5)
FLUAV RNA RESP QL NAA+PROBE: NOT DETECTED
FLUBV RNA RESP QL NAA+PROBE: NOT DETECTED
GLUCOSE BLDA-MCNC: 135 MG/DL (ref 74–99)
GLUCOSE SERPL-MCNC: 127 MG/DL (ref 65–99)
HCO3 BLDA-SCNC: 24.2 MMOL/L (ref 22–26)
HCT VFR BLD AUTO: 45.9 % (ref 41–52)
HCT VFR BLD EST: 45 % (ref 41–52)
HGB BLD-MCNC: 15.4 G/DL (ref 13.5–17.5)
HGB BLDA-MCNC: 15 G/DL (ref 13.5–17.5)
IMM GRANULOCYTES # BLD AUTO: 0.12 X10*3/UL (ref 0–0.7)
IMM GRANULOCYTES NFR BLD AUTO: 0.6 % (ref 0–0.9)
INHALED O2 CONCENTRATION: 40 %
LACTATE BLDA-SCNC: 1 MMOL/L (ref 0.4–2)
LYMPHOCYTES # BLD AUTO: 1.96 X10*3/UL (ref 1.2–4.8)
LYMPHOCYTES NFR BLD AUTO: 10.4 %
MCH RBC QN AUTO: 30.4 PG (ref 26–34)
MCHC RBC AUTO-ENTMCNC: 33.6 G/DL (ref 32–36)
MCV RBC AUTO: 91 FL (ref 80–100)
MONOCYTES # BLD AUTO: 1.59 X10*3/UL (ref 0.1–1)
MONOCYTES NFR BLD AUTO: 8.5 %
NEUTROPHILS # BLD AUTO: 15.07 X10*3/UL (ref 1.2–7.7)
NEUTROPHILS NFR BLD AUTO: 80.3 %
NRBC BLD-RTO: 0 /100 WBCS (ref 0–0)
NT-PROBNP SERPL-MCNC: 274 PG/ML (ref 0–229)
OXYHGB MFR BLDA: 95.9 % (ref 94–98)
PCO2 BLDA: 31 MM HG (ref 38–42)
PH BLDA: 7.5 PH (ref 7.38–7.42)
PLATELET # BLD AUTO: 139 X10*3/UL (ref 150–450)
PO2 BLDA: 96 MM HG (ref 85–95)
POTASSIUM BLDA-SCNC: 3.7 MMOL/L (ref 3.5–5.3)
POTASSIUM SERPL-SCNC: 3.7 MMOL/L (ref 3.4–5.1)
PROT SERPL-MCNC: 7.7 G/DL (ref 5.9–7.9)
RBC # BLD AUTO: 5.06 X10*6/UL (ref 4.5–5.9)
RSV RNA RESP QL NAA+PROBE: NOT DETECTED
SAO2 % BLDA: 96 % (ref 94–100)
SARS-COV-2 RNA RESP QL NAA+PROBE: NOT DETECTED
SODIUM BLDA-SCNC: 134 MMOL/L (ref 136–145)
SODIUM SERPL-SCNC: 138 MMOL/L (ref 133–145)
SPECIMEN DRAWN FROM PATIENT: ABNORMAL
TROPONIN T SERPL-MCNC: 11 NG/L
TROPONIN T SERPL-MCNC: 12 NG/L
TROPONIN T SERPL-MCNC: 13 NG/L
WBC # BLD AUTO: 18.8 X10*3/UL (ref 4.4–11.3)

## 2024-04-20 PROCEDURE — 2500000002 HC RX 250 W HCPCS SELF ADMINISTERED DRUGS (ALT 637 FOR MEDICARE OP, ALT 636 FOR OP/ED): Performed by: STUDENT IN AN ORGANIZED HEALTH CARE EDUCATION/TRAINING PROGRAM

## 2024-04-20 PROCEDURE — 93005 ELECTROCARDIOGRAM TRACING: CPT

## 2024-04-20 PROCEDURE — 71045 X-RAY EXAM CHEST 1 VIEW: CPT

## 2024-04-20 PROCEDURE — 84132 ASSAY OF SERUM POTASSIUM: CPT | Performed by: STUDENT IN AN ORGANIZED HEALTH CARE EDUCATION/TRAINING PROGRAM

## 2024-04-20 PROCEDURE — 84484 ASSAY OF TROPONIN QUANT: CPT | Performed by: STUDENT IN AN ORGANIZED HEALTH CARE EDUCATION/TRAINING PROGRAM

## 2024-04-20 PROCEDURE — 71045 X-RAY EXAM CHEST 1 VIEW: CPT | Performed by: RADIOLOGY

## 2024-04-20 PROCEDURE — 2500000004 HC RX 250 GENERAL PHARMACY W/ HCPCS (ALT 636 FOR OP/ED): Performed by: STUDENT IN AN ORGANIZED HEALTH CARE EDUCATION/TRAINING PROGRAM

## 2024-04-20 PROCEDURE — 71275 CT ANGIOGRAPHY CHEST: CPT

## 2024-04-20 PROCEDURE — 36415 COLL VENOUS BLD VENIPUNCTURE: CPT | Performed by: STUDENT IN AN ORGANIZED HEALTH CARE EDUCATION/TRAINING PROGRAM

## 2024-04-20 PROCEDURE — 2550000001 HC RX 255 CONTRASTS: Performed by: INTERNAL MEDICINE

## 2024-04-20 PROCEDURE — 2500000001 HC RX 250 WO HCPCS SELF ADMINISTERED DRUGS (ALT 637 FOR MEDICARE OP): Performed by: NURSE PRACTITIONER

## 2024-04-20 PROCEDURE — 2500000002 HC RX 250 W HCPCS SELF ADMINISTERED DRUGS (ALT 637 FOR MEDICARE OP, ALT 636 FOR OP/ED): Performed by: INTERNAL MEDICINE

## 2024-04-20 PROCEDURE — 36600 WITHDRAWAL OF ARTERIAL BLOOD: CPT

## 2024-04-20 PROCEDURE — 96374 THER/PROPH/DIAG INJ IV PUSH: CPT

## 2024-04-20 PROCEDURE — 99291 CRITICAL CARE FIRST HOUR: CPT | Performed by: STUDENT IN AN ORGANIZED HEALTH CARE EDUCATION/TRAINING PROGRAM

## 2024-04-20 PROCEDURE — 87637 SARSCOV2&INF A&B&RSV AMP PRB: CPT | Performed by: STUDENT IN AN ORGANIZED HEALTH CARE EDUCATION/TRAINING PROGRAM

## 2024-04-20 PROCEDURE — 84132 ASSAY OF SERUM POTASSIUM: CPT | Mod: 91 | Performed by: STUDENT IN AN ORGANIZED HEALTH CARE EDUCATION/TRAINING PROGRAM

## 2024-04-20 PROCEDURE — 2500000002 HC RX 250 W HCPCS SELF ADMINISTERED DRUGS (ALT 637 FOR MEDICARE OP, ALT 636 FOR OP/ED): Mod: MUE | Performed by: INTERNAL MEDICINE

## 2024-04-20 PROCEDURE — 94640 AIRWAY INHALATION TREATMENT: CPT

## 2024-04-20 PROCEDURE — 94667 MNPJ CHEST WALL 1ST: CPT

## 2024-04-20 PROCEDURE — 83880 ASSAY OF NATRIURETIC PEPTIDE: CPT | Performed by: STUDENT IN AN ORGANIZED HEALTH CARE EDUCATION/TRAINING PROGRAM

## 2024-04-20 PROCEDURE — 2500000004 HC RX 250 GENERAL PHARMACY W/ HCPCS (ALT 636 FOR OP/ED): Performed by: NURSE PRACTITIONER

## 2024-04-20 PROCEDURE — 96375 TX/PRO/DX INJ NEW DRUG ADDON: CPT

## 2024-04-20 PROCEDURE — 2500000002 HC RX 250 W HCPCS SELF ADMINISTERED DRUGS (ALT 637 FOR MEDICARE OP, ALT 636 FOR OP/ED): Mod: MUE | Performed by: STUDENT IN AN ORGANIZED HEALTH CARE EDUCATION/TRAINING PROGRAM

## 2024-04-20 PROCEDURE — 9420000001 HC RT PATIENT EDUCATION 5 MIN

## 2024-04-20 PROCEDURE — 1100000001 HC PRIVATE ROOM DAILY

## 2024-04-20 PROCEDURE — 85025 COMPLETE CBC W/AUTO DIFF WBC: CPT | Performed by: STUDENT IN AN ORGANIZED HEALTH CARE EDUCATION/TRAINING PROGRAM

## 2024-04-20 PROCEDURE — 71275 CT ANGIOGRAPHY CHEST: CPT | Performed by: RADIOLOGY

## 2024-04-20 RX ORDER — CEFTRIAXONE 1 G/50ML
1 INJECTION, SOLUTION INTRAVENOUS EVERY 24 HOURS
Status: DISCONTINUED | OUTPATIENT
Start: 2024-04-20 | End: 2024-04-20

## 2024-04-20 RX ORDER — POLYETHYLENE GLYCOL 3350 17 G/17G
17 POWDER, FOR SOLUTION ORAL DAILY PRN
Status: DISCONTINUED | OUTPATIENT
Start: 2024-04-20 | End: 2024-04-25 | Stop reason: HOSPADM

## 2024-04-20 RX ORDER — ACETAMINOPHEN 650 MG/1
650 SUPPOSITORY RECTAL EVERY 4 HOURS PRN
Status: DISCONTINUED | OUTPATIENT
Start: 2024-04-20 | End: 2024-04-25 | Stop reason: HOSPADM

## 2024-04-20 RX ORDER — HYDROXYCHLOROQUINE SULFATE 200 MG/1
200 TABLET, FILM COATED ORAL 2 TIMES DAILY
Status: DISCONTINUED | OUTPATIENT
Start: 2024-04-20 | End: 2024-04-25 | Stop reason: HOSPADM

## 2024-04-20 RX ORDER — ACETAMINOPHEN 160 MG/5ML
650 SOLUTION ORAL EVERY 4 HOURS PRN
Status: DISCONTINUED | OUTPATIENT
Start: 2024-04-20 | End: 2024-04-25 | Stop reason: HOSPADM

## 2024-04-20 RX ORDER — VANCOMYCIN 1 G/200ML
1 INJECTION, SOLUTION INTRAVENOUS EVERY 12 HOURS
Status: DISCONTINUED | OUTPATIENT
Start: 2024-04-20 | End: 2024-04-22

## 2024-04-20 RX ORDER — ONDANSETRON HYDROCHLORIDE 2 MG/ML
4 INJECTION, SOLUTION INTRAVENOUS EVERY 8 HOURS PRN
Status: DISCONTINUED | OUTPATIENT
Start: 2024-04-20 | End: 2024-04-25 | Stop reason: HOSPADM

## 2024-04-20 RX ORDER — IPRATROPIUM BROMIDE AND ALBUTEROL SULFATE 2.5; .5 MG/3ML; MG/3ML
3 SOLUTION RESPIRATORY (INHALATION)
Status: DISCONTINUED | OUTPATIENT
Start: 2024-04-20 | End: 2024-04-25 | Stop reason: HOSPADM

## 2024-04-20 RX ORDER — PANTOPRAZOLE SODIUM 40 MG/1
40 TABLET, DELAYED RELEASE ORAL
Status: DISCONTINUED | OUTPATIENT
Start: 2024-04-21 | End: 2024-04-25 | Stop reason: HOSPADM

## 2024-04-20 RX ORDER — ALUMINUM HYDROXIDE, MAGNESIUM HYDROXIDE, AND SIMETHICONE 1200; 120; 1200 MG/30ML; MG/30ML; MG/30ML
30 SUSPENSION ORAL EVERY 6 HOURS PRN
Status: DISCONTINUED | OUTPATIENT
Start: 2024-04-20 | End: 2024-04-25 | Stop reason: HOSPADM

## 2024-04-20 RX ORDER — IPRATROPIUM BROMIDE AND ALBUTEROL SULFATE 2.5; .5 MG/3ML; MG/3ML
3 SOLUTION RESPIRATORY (INHALATION)
Status: DISCONTINUED | OUTPATIENT
Start: 2024-04-20 | End: 2024-04-20

## 2024-04-20 RX ORDER — ENOXAPARIN SODIUM 100 MG/ML
40 INJECTION SUBCUTANEOUS EVERY 24 HOURS
Status: DISCONTINUED | OUTPATIENT
Start: 2024-04-20 | End: 2024-04-25 | Stop reason: HOSPADM

## 2024-04-20 RX ORDER — ACETAMINOPHEN 325 MG/1
650 TABLET ORAL EVERY 4 HOURS PRN
Status: DISCONTINUED | OUTPATIENT
Start: 2024-04-20 | End: 2024-04-25 | Stop reason: HOSPADM

## 2024-04-20 RX ORDER — VANCOMYCIN HYDROCHLORIDE 1 G/20ML
INJECTION, POWDER, LYOPHILIZED, FOR SOLUTION INTRAVENOUS DAILY PRN
Status: DISCONTINUED | OUTPATIENT
Start: 2024-04-20 | End: 2024-04-22

## 2024-04-20 RX ORDER — IPRATROPIUM BROMIDE AND ALBUTEROL SULFATE 2.5; .5 MG/3ML; MG/3ML
9 SOLUTION RESPIRATORY (INHALATION) ONCE
Status: COMPLETED | OUTPATIENT
Start: 2024-04-20 | End: 2024-04-20

## 2024-04-20 RX ORDER — IPRATROPIUM BROMIDE AND ALBUTEROL SULFATE 2.5; .5 MG/3ML; MG/3ML
3 SOLUTION RESPIRATORY (INHALATION) EVERY 2 HOUR PRN
Status: DISCONTINUED | OUTPATIENT
Start: 2024-04-20 | End: 2024-04-25 | Stop reason: HOSPADM

## 2024-04-20 RX ORDER — NABUMETONE 750 MG/1
750 TABLET, FILM COATED ORAL 2 TIMES DAILY
Status: DISCONTINUED | OUTPATIENT
Start: 2024-04-20 | End: 2024-04-25 | Stop reason: HOSPADM

## 2024-04-20 RX ORDER — ONDANSETRON 4 MG/1
4 TABLET, ORALLY DISINTEGRATING ORAL EVERY 8 HOURS PRN
Status: DISCONTINUED | OUTPATIENT
Start: 2024-04-20 | End: 2024-04-25 | Stop reason: HOSPADM

## 2024-04-20 RX ADMIN — METHYLPREDNISOLONE SODIUM SUCCINATE 40 MG: 40 INJECTION, POWDER, FOR SOLUTION INTRAMUSCULAR; INTRAVENOUS at 17:22

## 2024-04-20 RX ADMIN — IPRATROPIUM BROMIDE AND ALBUTEROL SULFATE 3 ML: 2.5; .5 SOLUTION RESPIRATORY (INHALATION) at 19:27

## 2024-04-20 RX ADMIN — HYDROXYCHLOROQUINE SULFATE 200 MG: 200 TABLET ORAL at 21:28

## 2024-04-20 RX ADMIN — IOHEXOL 75 ML: 350 INJECTION, SOLUTION INTRAVENOUS at 16:33

## 2024-04-20 RX ADMIN — VANCOMYCIN 1 G: 1 INJECTION, SOLUTION INTRAVENOUS at 21:30

## 2024-04-20 RX ADMIN — METHYLPREDNISOLONE SODIUM SUCCINATE 125 MG: 125 INJECTION, POWDER, FOR SOLUTION INTRAMUSCULAR; INTRAVENOUS at 12:47

## 2024-04-20 RX ADMIN — ENOXAPARIN SODIUM 40 MG: 40 INJECTION SUBCUTANEOUS at 17:22

## 2024-04-20 RX ADMIN — CEFTRIAXONE SODIUM 1 G: 1 INJECTION, SOLUTION INTRAVENOUS at 17:21

## 2024-04-20 RX ADMIN — NABUMETONE 750 MG: 750 TABLET, FILM COATED ORAL at 21:28

## 2024-04-20 RX ADMIN — AZITHROMYCIN MONOHYDRATE 500 MG: 500 INJECTION, POWDER, LYOPHILIZED, FOR SOLUTION INTRAVENOUS at 14:24

## 2024-04-20 RX ADMIN — METHYLPREDNISOLONE SODIUM SUCCINATE 40 MG: 40 INJECTION, POWDER, FOR SOLUTION INTRAMUSCULAR; INTRAVENOUS at 21:28

## 2024-04-20 RX ADMIN — PIPERACILLIN SODIUM AND TAZOBACTAM SODIUM 4.5 G: 4; .5 INJECTION, SOLUTION INTRAVENOUS at 19:59

## 2024-04-20 RX ADMIN — IPRATROPIUM BROMIDE AND ALBUTEROL SULFATE 9 ML: .5; 3 SOLUTION RESPIRATORY (INHALATION) at 12:47

## 2024-04-20 ASSESSMENT — ACTIVITIES OF DAILY LIVING (ADL)
JUDGMENT_ADEQUATE_SAFELY_COMPLETE_DAILY_ACTIVITIES: YES
BATHING: INDEPENDENT
ADEQUATE_TO_COMPLETE_ADL: YES
DRESSING YOURSELF: INDEPENDENT
PATIENT'S MEMORY ADEQUATE TO SAFELY COMPLETE DAILY ACTIVITIES?: YES
HEARING - LEFT EAR: FUNCTIONAL
FEEDING YOURSELF: INDEPENDENT
GROOMING: INDEPENDENT
TOILETING: INDEPENDENT
WALKS IN HOME: INDEPENDENT

## 2024-04-20 ASSESSMENT — COGNITIVE AND FUNCTIONAL STATUS - GENERAL
MOBILITY SCORE: 23
DAILY ACTIVITIY SCORE: 24
MOVING TO AND FROM BED TO CHAIR: A LITTLE

## 2024-04-20 ASSESSMENT — PAIN SCALES - GENERAL
PAINLEVEL_OUTOF10: 0 - NO PAIN

## 2024-04-20 ASSESSMENT — PAIN - FUNCTIONAL ASSESSMENT
PAIN_FUNCTIONAL_ASSESSMENT: UNABLE TO SELF-REPORT
PAIN_FUNCTIONAL_ASSESSMENT: 0-10

## 2024-04-20 ASSESSMENT — COLUMBIA-SUICIDE SEVERITY RATING SCALE - C-SSRS
6. HAVE YOU EVER DONE ANYTHING, STARTED TO DO ANYTHING, OR PREPARED TO DO ANYTHING TO END YOUR LIFE?: NO
1. IN THE PAST MONTH, HAVE YOU WISHED YOU WERE DEAD OR WISHED YOU COULD GO TO SLEEP AND NOT WAKE UP?: NO
2. HAVE YOU ACTUALLY HAD ANY THOUGHTS OF KILLING YOURSELF?: NO

## 2024-04-20 NOTE — ED PROVIDER NOTES
"HPI   Chief Complaint   Patient presents with    Shortness of Breath     Wife sts'HE HAS been breathing fast and lethargic I can hear rhonci in his lungs this started this  morning\"        This is a 69-year-old male with a past medical history of rheumatoid arthritis on methotrexate and Plaquenil presenting to ED for evaluation of shortness of breath.  He has had worsening shortness of breath for the past 3 to 4 days, he slept in a recliner last night due to inability to lay down because by his shortness of breath.  He denies associated chest pain or cough, nasal congestion or recent illness.  He denies any swelling in the legs.  He has never had an exacerbation like this before.  He does not wear oxygen at home.  He is a lifelong smoker and 10 to 15 years ago had an assessment by a pulmonologist to felt he may have the beginning signs of developing COPD, he has not had any pulmonary function testing since then.      History provided by:  Patient   used: No                        [place] Coma Scale Score: 14                     Patient History   Past Medical History:   Diagnosis Date    Ataxia 06/13/2023    Cough, unspecified 05/07/2015    Cough    Long term (current) use of systemic steroids 10/30/2017    On prednisone therapy    Memory loss 06/13/2023    Overweight with body mass index (BMI) 25.0-29.9 03/14/2023    Personal history of other diseases of the digestive system 10/17/2018    History of right inguinal hernia    Personal history of other diseases of the respiratory system 05/18/2015    History of chronic sinusitis    Personal history of other diseases of the respiratory system 02/02/2015    History of acute bronchitis with bronchospasm    Personal history of other infectious and parasitic diseases 10/07/2013    History of herpes zoster    Right wrist pain 06/13/2023    Transient ischemic attack 06/13/2023     Past Surgical History:   Procedure Laterality Date    PROSTATECTOMY  " 10/07/2013    Prostatectomy Radical     No family history on file.  Social History     Tobacco Use    Smoking status: Every Day     Types: Cigarettes     Passive exposure: Current    Smokeless tobacco: Current   Vaping Use    Vaping status: Never Used   Substance Use Topics    Alcohol use: Not Currently    Drug use: Never       Physical Exam   ED Triage Vitals [04/20/24 1226]   Temperature Heart Rate Respirations BP   37.2 °C (99 °F) (!) 104 (!) 28 143/82      Pulse Ox Temp Source Heart Rate Source Patient Position   (!) 89 % Oral Monitor Sitting      BP Location FiO2 (%)     Right arm --       Physical Exam  General: well developed, obese 69-year-old male who is awake and alert, oriented x 4.  Mildly tachypneic.  HENT: normocephalic, atraumatic. Pharynx without erythema or exudates, uvula midline.  CV: Tachycardic, regular rhythm, no murmur, no gallops, or rubs. radial and dorsalis pedis pulses +2/4 bilaterally  Resp: Sounds diminished throughout with expiratory wheezes in all lung fields.  No rales or rhonchi.  He is tachypneic with mild conversational dyspnea.  Patient is in mild respiratory distress on arrival.  GI: abdomen soft, nontender without rigidity or guarding, no peritoneal signs, abdomen is nondistended, no masses palpated  MSK: no swelling of the extremities.  Psych: appropriate mood and affect, cooperative with exam  Skin: warm, dry, without evidence of rash or abrasions    ED Course & MDM   ED Course as of 04/20/24 1417   Sat Apr 20, 2024   1228 EKG on my interpretation shows normal sinus rhythm, rate of 99 beats minute.  Normal axis.  QTc is 436 ms, NE interval 122.  No ST elevation or depression, no acute ischemic pattern.  No STEMI. [NT]      ED Course User Index  [NT] Roger Aparicio DO         Diagnoses as of 04/20/24 1417   COPD exacerbation (Multi)   Acute respiratory failure with hypoxia (Multi)       Medical Decision Making  PMH: Rheumatoid arthritis, obesity, TIA  History obtained:  directly from patient   Social factors affecting disposition: none    Patient presents to the ED with shortness of breath, see HPI for full details.  Multiple causes shortness of breath are considered including acute viral infection, pneumonia, CHF, COPD, asthma.  X-ray ordered to evaluate for evidence of pneumonia, pneumothorax, pulmonary edema.    Physical exam shows diminished lung sounds throughout and expiratory wheezing in all lung fields     Presentation most consistent with COPD exacerbation. Clinically the patient has no symptoms suggestive of DVT, I have low suspicion for PE given my physical exam findings his presentation is more consistent with COPD exacerbation.    Patient was treated with 3 DuoNeb treatments and IV Solu-Medrol, IV azithromycin    On reassessment after initial treatment improvement in his work of breathing, improvement of aeration but worsening of his wheezing.  He remains with diffuse wheezing in all lung fields on expiration.  Oxygen was removed and he fell down to 88% saturation, was placed back on 4 L of oxygen by nasal cannula.    I personally reviewed patient's chest x-ray which shows no obvious infiltrates, no evidence of typical pneumonia.  He does have increased interstitial markings which could be a sign of atypical pneumonia.  He was treated with azithromycin with suspicion for this in the setting of elevated white blood cell count with no other identifiable cause.  Azithromycin will also help as an anti-inflammatory effect for treatment of his COPD.    EKG shows no acute ischemic injury pattern.  No STEMI.    Findings were discussed with the patient and his family at bedside.  He did improve with treatment for COPD and will be admitted to the hospital for further medical management.  He is agreeable with the plan.  He was admitted in improved condition.    Procedure  Critical Care    Performed by: Roger Aparicio DO  Authorized by: Roger Aparicio DO    Critical  care provider statement:     Critical care time (minutes):  35    Critical care time was exclusive of:  Separately billable procedures and treating other patients    Critical care was necessary to treat or prevent imminent or life-threatening deterioration of the following conditions:  Respiratory failure    Critical care was time spent personally by me on the following activities:  Ordering and review of laboratory studies, ordering and review of radiographic studies, pulse oximetry, evaluation of patient's response to treatment, re-evaluation of patient's condition, development of treatment plan with patient or surrogate, review of old charts and examination of patient    Care discussed with: admitting provider         Roger Aparicio DO  04/20/24 0859

## 2024-04-20 NOTE — ED TRIAGE NOTES
Pt brought to room 13 from triage, MD at bedside at this time, RT called as pt was placed on 6L NC, does not wear o2 at home, Spo2 92% on 6L NC. Pt has been having increased weakness and feeling ill for the last 3-days.

## 2024-04-20 NOTE — PROGRESS NOTES
"Vancomycin Dosing by Pharmacy- INITIAL    Dipesh Rousseau is a 69 y.o. year old male who Pharmacy has been consulted for vancomycin dosing for pneumonia. Based on the patient's indication and renal status this patient will be dosed based on a goal AUC of 400-600.     Renal function is currently stable.    Visit Vitals  /80 (BP Location: Right arm, Patient Position: Lying)   Pulse 89   Temp 36.9 °C (98.4 °F) (Temporal)   Resp 19        Lab Results   Component Value Date    CREATININE 1.00 04/20/2024    CREATININE 0.97 05/09/2023    CREATININE 0.87 02/07/2023    CREATININE 1.01 11/18/2022    CREATININE 0.93 11/08/2022        Patient weight is No results found for: \"PTWEIGHT\"    No results found for: \"CULTURE\"     No intake/output data recorded.  [unfilled]    Lab Results   Component Value Date    PATIENTTEMP 37.0 04/20/2024          Assessment/Plan     Patient will not be given a loading dose.  Will initiate vancomycin maintenance,  1000 mg every 12 hours.    This dosing regimen is predicted by [employer] to result in the following pharmacokinetic parameters:    Loading dose: N/A  Regimen: 1000 mg IV every 12 hours.  Start time: 18:15 on 04/20/2024  Exposure target: AUC24 (range)400-600 mg/L.hr   AUC24,ss: 498 mg/L.hr  Probability of AUC24 > 400: 74 %  Ctrough,ss: 16.4 mg/L  Probability of Ctrough,ss > 20: 31 %  Probability of nephrotoxicity (Lodise DANIEL 2009): 12 %      Follow-up level will be ordered on 4/21/24 at AM labs unless clinically indicated sooner.  Will continue to monitor renal function daily while on vancomycin and order serum creatinine at least every 48 hours if not already ordered.  Follow for continued vancomycin needs, clinical response, and signs/symptoms of toxicity.       Con Tavares, PharmD       "

## 2024-04-20 NOTE — H&P
"History Of Present Illness  Dipesh Rousseau is a 69 y.o. male presenting with dyspnea.  Wife says that patient was having difficulty breathing last evening so he slept on the couch.  She says that she woke up to check on him and he looked like he was \"struggling to breathe \".  She said that she checked his oxygen level and it was only at 91% on room air.  She heard audible wheezing.  My examination patient is on 6 L of oxygen and satting at 94%.  Patient does desat to high 80s lower 90s with any exertion. patient denies any recent fever or chills.  Denies any recent ill contacts.  Pt says he developed a harsh non-productive cough a few days ago. He denies any chest pain or palpitations.    In ED course he was given 500 mg of IV azithromycin, 125 mg of ceftriaxone, and DuoNebs.       Past Medical History  COPD  Rheumatoid arthritis  Prostate CA      Surgical History  Prostatectomy  Right inguinal hernia repair       Social History  Patient lives at home with his spouse.  He is a daily smoker.  Denies EtOH use or street drugs.    Family History   in her 70s from complications of end-stage renal disease.  Had a history of diabetes  Dad  in his 80s from complications of dementia       Allergies  Patient has no known allergies.    Review of Systems   All other systems reviewed and are negative.       Physical Exam  Constitutional:       General: He is not in acute distress.     Appearance: He is not toxic-appearing.   Cardiovascular:      Rate and Rhythm: Normal rate and regular rhythm.      Pulses: Normal pulses.      Heart sounds: Normal heart sounds.   Pulmonary:      Effort: No respiratory distress.      Breath sounds: Wheezing present.   Abdominal:      General: Bowel sounds are normal.      Palpations: Abdomen is soft.   Musculoskeletal:         General: Normal range of motion.   Skin:     General: Skin is warm and dry.   Neurological:      General: No focal deficit present.      Mental Status: He is " "alert and oriented to person, place, and time.   Psychiatric:         Mood and Affect: Mood normal.         Behavior: Behavior normal.          Last Recorded Vitals  Blood pressure 135/76, pulse 93, temperature 37.2 °C (99 °F), resp. rate (!) 26, height 1.702 m (5' 7\"), weight 97.5 kg (215 lb), SpO2 (!) 93%.    Relevant Results  Scheduled medications  azithromycin, 500 mg, intravenous, Once      Continuous medications     PRN medications              Assessment/Plan   Principal Problem:    COPD exacerbation (Multi)    COPD exacerbation  -pulmonology consult  -IV solumedrol  -duonebs  -CT chest      Acute Respiratory Failure with hypoxia  -oxygen PRN, wean as tolerated    SIRS (tachycardia, tachypnea, leukocytosis)  -empiric ATB (broad spectrum for now) pt immunocompromised from RA meds  -Monitor    Tobacco Use  -encourage cessation    GERD   -continue PPI    RA  -continue home meds    Plan  Above plan discussed length with patient and wife at the bedside they are in agreement  CODE STATUS discussed and patient wishes to be full measures  Above Case and plan discussed collaborating physician    Janie Zuluaga, APRN-CNP    "

## 2024-04-21 LAB
ALBUMIN SERPL-MCNC: 3.5 G/DL (ref 3.5–5)
ALP BLD-CCNC: 81 U/L (ref 35–125)
ALT SERPL-CCNC: 35 U/L (ref 5–40)
ANION GAP SERPL CALC-SCNC: 9 MMOL/L
AST SERPL-CCNC: 41 U/L (ref 5–40)
BILIRUB SERPL-MCNC: 0.4 MG/DL (ref 0.1–1.2)
BUN SERPL-MCNC: 15 MG/DL (ref 8–25)
CALCIUM SERPL-MCNC: 8.5 MG/DL (ref 8.5–10.4)
CHLORIDE SERPL-SCNC: 103 MMOL/L (ref 97–107)
CO2 SERPL-SCNC: 24 MMOL/L (ref 24–31)
CREAT SERPL-MCNC: 0.9 MG/DL (ref 0.4–1.6)
EGFRCR SERPLBLD CKD-EPI 2021: >90 ML/MIN/1.73M*2
ERYTHROCYTE [DISTWIDTH] IN BLOOD BY AUTOMATED COUNT: 14.4 % (ref 11.5–14.5)
GLUCOSE SERPL-MCNC: 165 MG/DL (ref 65–99)
HCT VFR BLD AUTO: 39.1 % (ref 41–52)
HGB BLD-MCNC: 13.2 G/DL (ref 13.5–17.5)
MCH RBC QN AUTO: 30.7 PG (ref 26–34)
MCHC RBC AUTO-ENTMCNC: 33.8 G/DL (ref 32–36)
MCV RBC AUTO: 91 FL (ref 80–100)
MRSA DNA SPEC QL NAA+PROBE: NOT DETECTED
NRBC BLD-RTO: 0 /100 WBCS (ref 0–0)
PLATELET # BLD AUTO: 102 X10*3/UL (ref 150–450)
POTASSIUM SERPL-SCNC: 3.8 MMOL/L (ref 3.4–5.1)
PROT SERPL-MCNC: 6.4 G/DL (ref 5.9–7.9)
RBC # BLD AUTO: 4.3 X10*6/UL (ref 4.5–5.9)
SODIUM SERPL-SCNC: 136 MMOL/L (ref 133–145)
VANCOMYCIN SERPL-MCNC: 6.7 UG/ML (ref 10–20)
WBC # BLD AUTO: 15.2 X10*3/UL (ref 4.4–11.3)

## 2024-04-21 PROCEDURE — 2500000005 HC RX 250 GENERAL PHARMACY W/O HCPCS: Performed by: INTERNAL MEDICINE

## 2024-04-21 PROCEDURE — 2500000002 HC RX 250 W HCPCS SELF ADMINISTERED DRUGS (ALT 637 FOR MEDICARE OP, ALT 636 FOR OP/ED): Mod: MUE | Performed by: INTERNAL MEDICINE

## 2024-04-21 PROCEDURE — 1100000001 HC PRIVATE ROOM DAILY

## 2024-04-21 PROCEDURE — 2500000001 HC RX 250 WO HCPCS SELF ADMINISTERED DRUGS (ALT 637 FOR MEDICARE OP): Performed by: NURSE PRACTITIONER

## 2024-04-21 PROCEDURE — 9420000001 HC RT PATIENT EDUCATION 5 MIN

## 2024-04-21 PROCEDURE — 2500000002 HC RX 250 W HCPCS SELF ADMINISTERED DRUGS (ALT 637 FOR MEDICARE OP, ALT 636 FOR OP/ED): Performed by: INTERNAL MEDICINE

## 2024-04-21 PROCEDURE — 36415 COLL VENOUS BLD VENIPUNCTURE: CPT | Performed by: NURSE PRACTITIONER

## 2024-04-21 PROCEDURE — 94640 AIRWAY INHALATION TREATMENT: CPT | Mod: MUE

## 2024-04-21 PROCEDURE — 2500000004 HC RX 250 GENERAL PHARMACY W/ HCPCS (ALT 636 FOR OP/ED): Performed by: NURSE PRACTITIONER

## 2024-04-21 PROCEDURE — 84075 ASSAY ALKALINE PHOSPHATASE: CPT | Performed by: NURSE PRACTITIONER

## 2024-04-21 PROCEDURE — 85027 COMPLETE CBC AUTOMATED: CPT | Performed by: NURSE PRACTITIONER

## 2024-04-21 PROCEDURE — 80202 ASSAY OF VANCOMYCIN: CPT | Performed by: NURSE PRACTITIONER

## 2024-04-21 PROCEDURE — 87641 MR-STAPH DNA AMP PROBE: CPT | Performed by: NURSE PRACTITIONER

## 2024-04-21 PROCEDURE — 94668 MNPJ CHEST WALL SBSQ: CPT

## 2024-04-21 RX ADMIN — NABUMETONE 750 MG: 750 TABLET, FILM COATED ORAL at 08:42

## 2024-04-21 RX ADMIN — IPRATROPIUM BROMIDE AND ALBUTEROL SULFATE 3 ML: 2.5; .5 SOLUTION RESPIRATORY (INHALATION) at 07:41

## 2024-04-21 RX ADMIN — HYDROXYCHLOROQUINE SULFATE 200 MG: 200 TABLET ORAL at 08:42

## 2024-04-21 RX ADMIN — PIPERACILLIN SODIUM AND TAZOBACTAM SODIUM 4.5 G: 4; .5 INJECTION, SOLUTION INTRAVENOUS at 14:42

## 2024-04-21 RX ADMIN — HYDROXYCHLOROQUINE SULFATE 200 MG: 200 TABLET ORAL at 20:48

## 2024-04-21 RX ADMIN — Medication 5 L/MIN: at 08:15

## 2024-04-21 RX ADMIN — METHYLPREDNISOLONE SODIUM SUCCINATE 40 MG: 40 INJECTION, POWDER, FOR SOLUTION INTRAMUSCULAR; INTRAVENOUS at 14:42

## 2024-04-21 RX ADMIN — PANTOPRAZOLE SODIUM 40 MG: 40 TABLET, DELAYED RELEASE ORAL at 06:04

## 2024-04-21 RX ADMIN — VANCOMYCIN 1 G: 1 INJECTION, SOLUTION INTRAVENOUS at 08:42

## 2024-04-21 RX ADMIN — NABUMETONE 750 MG: 750 TABLET, FILM COATED ORAL at 20:48

## 2024-04-21 RX ADMIN — PIPERACILLIN SODIUM AND TAZOBACTAM SODIUM 4.5 G: 4; .5 INJECTION, SOLUTION INTRAVENOUS at 20:45

## 2024-04-21 RX ADMIN — IPRATROPIUM BROMIDE AND ALBUTEROL SULFATE 3 ML: 2.5; .5 SOLUTION RESPIRATORY (INHALATION) at 19:23

## 2024-04-21 RX ADMIN — PIPERACILLIN SODIUM AND TAZOBACTAM SODIUM 4.5 G: 4; .5 INJECTION, SOLUTION INTRAVENOUS at 03:00

## 2024-04-21 RX ADMIN — Medication: at 23:00

## 2024-04-21 RX ADMIN — AZITHROMYCIN MONOHYDRATE 500 MG: 500 INJECTION, POWDER, LYOPHILIZED, FOR SOLUTION INTRAVENOUS at 09:47

## 2024-04-21 RX ADMIN — IPRATROPIUM BROMIDE AND ALBUTEROL SULFATE 3 ML: 2.5; .5 SOLUTION RESPIRATORY (INHALATION) at 14:00

## 2024-04-21 RX ADMIN — PIPERACILLIN SODIUM AND TAZOBACTAM SODIUM 4.5 G: 4; .5 INJECTION, SOLUTION INTRAVENOUS at 08:43

## 2024-04-21 RX ADMIN — METHYLPREDNISOLONE SODIUM SUCCINATE 40 MG: 40 INJECTION, POWDER, FOR SOLUTION INTRAMUSCULAR; INTRAVENOUS at 21:36

## 2024-04-21 RX ADMIN — ENOXAPARIN SODIUM 40 MG: 40 INJECTION SUBCUTANEOUS at 16:14

## 2024-04-21 RX ADMIN — VANCOMYCIN 1 G: 1 INJECTION, SOLUTION INTRAVENOUS at 20:46

## 2024-04-21 RX ADMIN — Medication 5 L/MIN: at 15:00

## 2024-04-21 RX ADMIN — METHYLPREDNISOLONE SODIUM SUCCINATE 40 MG: 40 INJECTION, POWDER, FOR SOLUTION INTRAMUSCULAR; INTRAVENOUS at 05:13

## 2024-04-21 SDOH — SOCIAL STABILITY: SOCIAL INSECURITY: DO YOU FEEL UNSAFE GOING BACK TO THE PLACE WHERE YOU ARE LIVING?: NO

## 2024-04-21 SDOH — SOCIAL STABILITY: SOCIAL INSECURITY: ARE YOU OR HAVE YOU BEEN THREATENED OR ABUSED PHYSICALLY, EMOTIONALLY, OR SEXUALLY BY ANYONE?: NO

## 2024-04-21 SDOH — SOCIAL STABILITY: SOCIAL INSECURITY: HAS ANYONE EVER THREATENED TO HURT YOUR FAMILY OR YOUR PETS?: NO

## 2024-04-21 SDOH — SOCIAL STABILITY: SOCIAL INSECURITY: ABUSE: ADULT

## 2024-04-21 SDOH — SOCIAL STABILITY: SOCIAL INSECURITY: HAVE YOU HAD THOUGHTS OF HARMING ANYONE ELSE?: NO

## 2024-04-21 SDOH — SOCIAL STABILITY: SOCIAL INSECURITY: HAVE YOU HAD ANY THOUGHTS OF HARMING ANYONE ELSE?: NO

## 2024-04-21 SDOH — SOCIAL STABILITY: SOCIAL INSECURITY: DO YOU FEEL ANYONE HAS EXPLOITED OR TAKEN ADVANTAGE OF YOU FINANCIALLY OR OF YOUR PERSONAL PROPERTY?: NO

## 2024-04-21 SDOH — SOCIAL STABILITY: SOCIAL INSECURITY: DOES ANYONE TRY TO KEEP YOU FROM HAVING/CONTACTING OTHER FRIENDS OR DOING THINGS OUTSIDE YOUR HOME?: NO

## 2024-04-21 SDOH — SOCIAL STABILITY: SOCIAL INSECURITY: ARE THERE ANY APPARENT SIGNS OF INJURIES/BEHAVIORS THAT COULD BE RELATED TO ABUSE/NEGLECT?: NO

## 2024-04-21 ASSESSMENT — PAIN SCALES - GENERAL
PAINLEVEL_OUTOF10: 0 - NO PAIN

## 2024-04-21 ASSESSMENT — COGNITIVE AND FUNCTIONAL STATUS - GENERAL
MOBILITY SCORE: 22
DRESSING REGULAR LOWER BODY CLOTHING: A LITTLE
MOBILITY SCORE: 23
DRESSING REGULAR LOWER BODY CLOTHING: A LITTLE
MOBILITY SCORE: 22
CLIMB 3 TO 5 STEPS WITH RAILING: A LITTLE
CLIMB 3 TO 5 STEPS WITH RAILING: A LOT
CLIMB 3 TO 5 STEPS WITH RAILING: A LOT
DAILY ACTIVITIY SCORE: 23
DRESSING REGULAR LOWER BODY CLOTHING: A LITTLE
CLIMB 3 TO 5 STEPS WITH RAILING: A LOT
DRESSING REGULAR LOWER BODY CLOTHING: A LITTLE
DAILY ACTIVITIY SCORE: 23
MOBILITY SCORE: 22
PATIENT BASELINE BEDBOUND: NO
DAILY ACTIVITIY SCORE: 23

## 2024-04-21 ASSESSMENT — ACTIVITIES OF DAILY LIVING (ADL)
DRESSING YOURSELF: INDEPENDENT
LACK_OF_TRANSPORTATION: NO
HEARING - RIGHT EAR: FUNCTIONAL
JUDGMENT_ADEQUATE_SAFELY_COMPLETE_DAILY_ACTIVITIES: YES
WALKS IN HOME: INDEPENDENT
TOILETING: INDEPENDENT
FEEDING YOURSELF: INDEPENDENT
HEARING - LEFT EAR: FUNCTIONAL
BATHING: INDEPENDENT
PATIENT'S MEMORY ADEQUATE TO SAFELY COMPLETE DAILY ACTIVITIES?: YES
GROOMING: INDEPENDENT

## 2024-04-21 ASSESSMENT — PAIN - FUNCTIONAL ASSESSMENT
PAIN_FUNCTIONAL_ASSESSMENT: 0-10

## 2024-04-21 ASSESSMENT — LIFESTYLE VARIABLES
HOW MANY STANDARD DRINKS CONTAINING ALCOHOL DO YOU HAVE ON A TYPICAL DAY: PATIENT DECLINED
HOW OFTEN DO YOU HAVE A DRINK CONTAINING ALCOHOL: PATIENT DECLINED
AUDIT-C TOTAL SCORE: -1
SKIP TO QUESTIONS 9-10: 0
AUDIT-C TOTAL SCORE: -1
HOW OFTEN DO YOU HAVE 6 OR MORE DRINKS ON ONE OCCASION: PATIENT DECLINED

## 2024-04-21 ASSESSMENT — PATIENT HEALTH QUESTIONNAIRE - PHQ9
1. LITTLE INTEREST OR PLEASURE IN DOING THINGS: NOT AT ALL
2. FEELING DOWN, DEPRESSED OR HOPELESS: NOT AT ALL
SUM OF ALL RESPONSES TO PHQ9 QUESTIONS 1 & 2: 0

## 2024-04-21 ASSESSMENT — PAIN SCALES - WONG BAKER: WONGBAKER_NUMERICALRESPONSE: NO HURT

## 2024-04-21 NOTE — CONSULTS
"Inpatient consult to Pulmonology  Consult performed by: Mohit Larkin MD  Consult ordered by: SAAD Santiago-CNP            Pulmonary Consult    Reason For Consult  Abnormal CT  History Of Present Illness  Dipesh Rousseau is a 69 y.o. male presenting with COPD exacerbation (Multi). Shortness of breath and hypoxia. Cough for the past several days. Presented with hypoxia     Past Medical History  He has a past medical history of Ataxia (06/13/2023), Cough, unspecified (05/07/2015), Long term (current) use of systemic steroids (10/30/2017), Memory loss (06/13/2023), Overweight with body mass index (BMI) 25.0-29.9 (03/14/2023), Personal history of other diseases of the digestive system (10/17/2018), Personal history of other diseases of the respiratory system (05/18/2015), Personal history of other diseases of the respiratory system (02/02/2015), Personal history of other infectious and parasitic diseases (10/07/2013), Right wrist pain (06/13/2023), and Transient ischemic attack (06/13/2023).    Surgical History  He has a past surgical history that includes Prostatectomy (10/07/2013).     Social History  He reports that he has been smoking cigarettes. He has been exposed to tobacco smoke. He uses smokeless tobacco. He reports that he does not currently use alcohol. He reports that he does not use drugs.      Family History  No family history on file.     Allergies  Patient has no known allergies.    Review of Systems   All other systems reviewed and are negative.      Last Recorded Vitals  Blood pressure 126/82, pulse 67, temperature 36.6 °C (97.9 °F), temperature source Temporal, resp. rate 16, height 1.702 m (5' 7\"), weight 97.5 kg (215 lb), SpO2 94%.    Intake/Output    Intake/Output Summary (Last 24 hours) at 4/21/2024 1549  Last data filed at 4/21/2024 0930  Gross per 24 hour   Intake 1650 ml   Output --   Net 1650 ml       Physical Exam  Vitals reviewed.   Constitutional:       Appearance: Normal " appearance.   HENT:      Head: Normocephalic and atraumatic.      Mouth/Throat:      Mouth: Mucous membranes are moist.   Eyes:      Extraocular Movements: Extraocular movements intact.      Pupils: Pupils are equal, round, and reactive to light.   Cardiovascular:      Rate and Rhythm: Normal rate and regular rhythm.   Pulmonary:      Effort: Pulmonary effort is normal.      Breath sounds: Normal breath sounds and air entry.   Abdominal:      General: Abdomen is flat. Bowel sounds are normal.      Palpations: Abdomen is soft.   Musculoskeletal:         General: Normal range of motion.      Cervical back: Normal range of motion and neck supple.   Skin:     General: Skin is warm and dry.   Neurological:      General: No focal deficit present.      Mental Status: He is alert and oriented to person, place, and time. Mental status is at baseline.      ...    Oxygen Therapy  SpO2: 94 %  Medical Gas Therapy: Supplemental oxygen  O2 Delivery Method: Nasal cannula  FiO2 (%):  [40 %] 40 %    Lines and Tubes:  Peripheral IV 04/20/24 20 G Left Forearm (Active)   Placement Date/Time: 04/20/24 1232   Size (Gauge): 20 G  Orientation: Left  Location: Forearm   Number of days: 1         Scheduled medications  azithromycin, 500 mg, intravenous, q24h  enoxaparin, 40 mg, subcutaneous, q24h  hydroxychloroquine, 200 mg, oral, BID  ipratropium-albuteroL, 3 mL, nebulization, TID  methylPREDNISolone sodium succinate (PF), 40 mg, intravenous, q8h MIGUEL  nabumetone, 750 mg, oral, BID  oxygen, , inhalation, q8h  pantoprazole, 40 mg, oral, Daily before breakfast  piperacillin-tazobactam, 4.5 g, intravenous, q6h  vancomycin (Xellia) 1 g in 200 mL, 1 g, intravenous, q12h      Continuous medications     PRN medications  PRN medications: acetaminophen **OR** acetaminophen **OR** acetaminophen, alum-mag hydroxide-simeth, ipratropium-albuteroL, ondansetron ODT **OR** ondansetron, polyethylene glycol, vancomycin    Relevant Results  Results from last 7  days   Lab Units 04/21/24  0450 04/20/24  1240   WBC AUTO x10*3/uL 15.2* 18.8*   HEMOGLOBIN g/dL 13.2* 15.4   HEMATOCRIT % 39.1* 45.9   PLATELETS AUTO x10*3/uL 102* 139*      Results from last 7 days   Lab Units 04/21/24  0450 04/20/24  1240   SODIUM mmol/L 136 138   POTASSIUM mmol/L 3.8 3.7   CHLORIDE mmol/L 103 102   CO2 mmol/L 24 25   BUN mg/dL 15 14   CREATININE mg/dL 0.90 1.00   GLUCOSE mg/dL 165* 127*   CALCIUM mg/dL 8.5 8.8      Results from last 7 days   Lab Units 04/20/24  1243   POCT PH, ARTERIAL pH 7.50*   POCT PCO2, ARTERIAL mm Hg 31*   POCT PO2, ARTERIAL mm Hg 96*   POCT HCO3 CALCULATED, ARTERIAL mmol/L 24.2   POCT BASE EXCESS, ARTERIAL mmol/L 1.8     CT angio chest for pulmonary embolism 04/20/2024    Narrative  Interpreted By:  Rigo Garrett,  STUDY:  CT ANGIO CHEST FOR PULMONARY EMBOLISM;  4/20/2024 4:33 pm    INDICATION:  Signs/Symptoms:PE Protocol.    COMPARISON:  None.    ACCESSION NUMBER(S):  [id number]    ORDERING CLINICIAN:  MAKAYLA PETERSEN    TECHNIQUE:  Helical data acquisition of the chest was obtained after IV injection  of  100 ml of  Omnipaque 350. Images were reformatted in axial,  coronal, and sagittal planes. 3D reconstructed MIPS volumetric images  were also generated at an independent workstation and reviewed.    FINDINGS:  POTENTIAL LIMITATIONS OF THE STUDY: None    HEART AND VESSELS:  No discrete filling defects within the main pulmonary artery or its  branches.    The thoracic aorta is of normal course and caliber without aneurysm,  dissection or significant atherosclerotic calcification.    No coronary artery calcifications are seen. The study is not  optimized for evaluation of coronary arteries.    The cardiac chambers are not enlarged.    MEDIASTINUM AND MATEUS, LOWER NECK AND AXILLA:  Enlargement and heterogeneity of the thyroid indicating goiter, with  the largest nodule on the left measuring approximately 2.6 cm, and  with several punctate calcifications. This also extends  substernally.    There are several borderline mediastinal nodes most likely from  reactive hypertrophy. No significant hilar adenopathy.    LUNGS AND AIRWAYS:    Mild bronchial wall thickening extends into the posterior basal  segments with some parenchymal reticular and ground-glass  attenuation, and tree-in-bud nodularity best seen in the left lower  lung posteriorly.. This is probably from chronic bronchitis with  bronchiolitis and peripheral mucous plugs. Bronchiectasis and wall  thickening indicating bronchitis also extends into the middle lobe.  There is a more discrete 1.1 cm nodular opacity at the right base  posteromedially on image 227 of series 11. This may be due to focal  atelectasis, but a parenchymal nodule is possible. Follow-up is  therefore recommended as: Incidental Finding:  A solid non-calcified  pulmonary nodule measuring greater than 8 mm.  (**-YCF-**)    Instructions:  Consider short term follow up non-contrast Chest CT at  3 months, PET/CT or tissue sampling. Note, PET/CT may be limited in  low grade malignancy, nodules <1 cm size or those located close to  diaphragm. (Luiz Lewis et al., Guidelines for management of  incidental pulmonary nodules detected on CT images: From the  Fleischner Society 2017, Radiology. 2017 Jul;284 (1):228-243.)  [email or web address]    No pleural effusion.    UPPER ABDOMEN AND OTHER FINDINGS:  The visualized subdiaphragmatic structures demonstrate no remarkable  findings.    CHEST WALL AND OSSEOUS STRUCTURES:  There are no suspicious osseous lesions.    Impression  1.  No evidence of pulmonary embolus, aneurysm or dissection.  2.  Bronchitis probably with peripheral mucous plugs, bronchiolitis  and possibly focal pneumonia of the lower lungs and middle lobe as  described.  3. Possible right lower lung nodule as described with recommendation  for follow-up.  4. Borderline probable reactive mediastinal nodes.  5. Goiter.    Signed by: Rigo Garrett 4/20/2024 5:23  PM  Dictation workstation:   [id number]      Assessment/Plan   Principal Problem:    COPD exacerbation (Multi)  Abnormal CT  Lung nodule vs infiltrate  Acute resp failure    Copd exac  Antibiotics  Steroids  Bronchodilators    CT reviewed and discussed with patient  RLL nodule. Suspect infectious but need to r/o malignancy.  Follow up CT in 6 weeks.      Mohit Larkin MD  [facility]

## 2024-04-21 NOTE — PROGRESS NOTES
"Vancomycin Dosing by Pharmacy- FOLLOW UP    Dipesh Rousseau is a 69 y.o. year old male who Pharmacy has been consulted for vancomycin dosing for pneumonia. Based on the patient's indication and renal status this patient is being dosed based on a goal AUC of 400-600.     Renal function is currently stable.    Current vancomycin dose: 1000 mg given every 12 hours    Estimated vancomycin AUC on current dose: 472 mg/L.hr     Visit Vitals  /82 (BP Location: Right arm, Patient Position: Lying)   Pulse 67   Temp 36.6 °C (97.9 °F) (Temporal)   Resp 16        Lab Results   Component Value Date    CREATININE 0.90 04/21/2024    CREATININE 1.00 04/20/2024    CREATININE 0.97 05/09/2023    CREATININE 0.87 02/07/2023    CREATININE 1.01 11/18/2022    CREATININE 0.93 11/08/2022        Patient weight is No results found for: \"PTWEIGHT\"    No results found for: \"CULTURE\"     I/O last 3 completed shifts:  In: 1300 (13.3 mL/kg) [P.O.:900; IV Piggyback:400]  Out: - (0 mL/kg)   Weight: 97.5 kg   [unfilled]    Lab Results   Component Value Date    PATIENTTEMP 37.0 04/20/2024        Assessment/Plan    Within goal AUC range. Continue current vancomycin regimen.    This dosing regimen is predicted by [employer] to result in the following pharmacokinetic parameters:  <<<<<[employer] DATA>>>>>  Loading dose: N/A  Regimen: 1000 mg IV every 12 hours.  Start time: 20:42 on 04/21/2024  Exposure target: AUC24 (range)400-600 mg/L.hr   AUC24,ss: 472 mg/L.hr  Probability of AUC24 > 400: 76 %  Ctrough,ss: 15.4 mg/L  Probability of Ctrough,ss > 20: 20 %  Probability of nephrotoxicity (Lodise DANIEL 2009): 11 %    The next level will be obtained on 04/26 at 0500. May be obtained sooner if clinically indicated.   Will continue to monitor renal function daily while on vancomycin and order serum creatinine at least every 48 hours if not already ordered.  Follow for continued vancomycin needs, clinical response, and signs/symptoms of toxicity. "       RICARDO CRAIG, PharmD

## 2024-04-21 NOTE — PROGRESS NOTES
"Dipesh Rousseau is a 69 y.o. male on day 1 of admission presenting with COPD exacerbation (Multi).    Subjective   Patient seen and examined.  No documented concerns/events overnight from nursing.  Patient says his dyspnea is slowly improving.  He now has a cough productive of yellow sputum       Objective     Physical Exam  Constitutional:       General: He is not in acute distress.     Appearance: He is not toxic-appearing.   Cardiovascular:      Rate and Rhythm: Normal rate and regular rhythm.      Pulses: Normal pulses.      Heart sounds: Normal heart sounds.   Pulmonary:      Effort: No respiratory distress.      Breath sounds: Wheezing present.   Abdominal:      General: Bowel sounds are normal.      Palpations: Abdomen is soft.   Musculoskeletal:         General: Normal range of motion.   Skin:     General: Skin is warm and dry.   Neurological:      General: No focal deficit present.      Mental Status: He is alert and oriented to person, place, and time.   Psychiatric:         Mood and Affect: Mood normal.         Behavior: Behavior normal.     Last Recorded Vitals  Blood pressure 125/79, pulse 60, temperature 36.9 °C (98.4 °F), temperature source Temporal, resp. rate 18, height 1.702 m (5' 7\"), weight 97.5 kg (215 lb), SpO2 94%.  Intake/Output last 3 Shifts:  I/O last 3 completed shifts:  In: 1300 (13.3 mL/kg) [P.O.:900; IV Piggyback:400]  Out: - (0 mL/kg)   Weight: 97.5 kg     Relevant Results    Scheduled medications  azithromycin, 500 mg, intravenous, q24h  enoxaparin, 40 mg, subcutaneous, q24h  hydroxychloroquine, 200 mg, oral, BID  ipratropium-albuteroL, 3 mL, nebulization, TID  methylPREDNISolone sodium succinate (PF), 40 mg, intravenous, q8h MIGUEL  nabumetone, 750 mg, oral, BID  oxygen, , inhalation, q8h  pantoprazole, 40 mg, oral, Daily before breakfast  piperacillin-tazobactam, 4.5 g, intravenous, q6h  vancomycin (Xellia) 1 g in 200 mL, 1 g, intravenous, q12h      Continuous medications     PRN " medications  PRN medications: acetaminophen **OR** acetaminophen **OR** acetaminophen, alum-mag hydroxide-simeth, ipratropium-albuteroL, ondansetron ODT **OR** ondansetron, polyethylene glycol, vancomycin     following data reviewed    WBC- 15.2  Hgb-13.2  Hct-39.1  Platelets-102    AST-41  ALT- 35  Alk Phos-81  Total Diego-0.4    Na-136  K+-3.8  Cl-103  Bicarb-24  BUN-15  creatinine-0.4     CTA  IMPRESSION:  1.  No evidence of pulmonary embolus, aneurysm or dissection.  2.  Bronchitis probably with peripheral mucous plugs, bronchiolitis  and possibly focal pneumonia of the lower lungs and middle lobe as  described.  3. Possible right lower lung nodule as described with recommendation  for follow-up.  4. Borderline probable reactive mediastinal nodes.  5. Goiter.                          Assessment/Plan   Principal Problem:    COPD exacerbation (Multi)    COPD exacerbation  -pulmonology consult  -IV solumedrol  -duonebs  -CT chest        Acute Respiratory Failure with hypoxia  -oxygen PRN, wean as tolerated     SIRS (tachycardia, tachypnea, leukocytosis)  -empiric ATB (broad spectrum for now) pt immunocompromised from RA meds  -Monitor     Tobacco Use  -encourage cessation     GERD   -continue PPI     RA  -continue home meds     Plan  Above plan discussed length with patient and wife at the bedside they are in agreement  CTA results discussed at length and they are aware they need to follow-up regarding the right lung nodule  Janie Zuluaga, APRN-CNP

## 2024-04-21 NOTE — CARE PLAN
The patient's goals for the shift include  not getting sob    The clinical goals for the shift include to maintain POX wnl

## 2024-04-21 NOTE — CARE PLAN
Problem: Respiratory  Goal: Clear secretions with interventions this shift  Outcome: Progressing  Goal: No signs of respiratory distress (eg. Use of accessory muscles. Peds grunting)  Outcome: Progressing  Goal: Verbalize decreased shortness of breath this shift  Outcome: Progressing  Goal: Wean oxygen to maintain O2 saturation per order/standard this shift  Outcome: Progressing     Problem: Pain - Adult  Goal: Verbalizes/displays adequate comfort level or baseline comfort level  Outcome: Progressing     Problem: Safety - Adult  Goal: Free from fall injury  Outcome: Progressing     Problem: Discharge Planning  Goal: Discharge to home or other facility with appropriate resources  Outcome: Progressing     Problem: Chronic Conditions and Co-morbidities  Goal: Patient's chronic conditions and co-morbidity symptoms are monitored and maintained or improved  Outcome: Progressing   The patient's goals for the shift include      The clinical goals for the shift include to maintain POX wnl

## 2024-04-22 ENCOUNTER — APPOINTMENT (OUTPATIENT)
Dept: CARDIOLOGY | Facility: HOSPITAL | Age: 69
DRG: 190 | End: 2024-04-22
Payer: MEDICARE

## 2024-04-22 PROCEDURE — 2500000005 HC RX 250 GENERAL PHARMACY W/O HCPCS: Performed by: INTERNAL MEDICINE

## 2024-04-22 PROCEDURE — 94664 DEMO&/EVAL PT USE INHALER: CPT

## 2024-04-22 PROCEDURE — 94640 AIRWAY INHALATION TREATMENT: CPT | Mod: MUE

## 2024-04-22 PROCEDURE — 93005 ELECTROCARDIOGRAM TRACING: CPT

## 2024-04-22 PROCEDURE — 94668 MNPJ CHEST WALL SBSQ: CPT

## 2024-04-22 PROCEDURE — 2500000002 HC RX 250 W HCPCS SELF ADMINISTERED DRUGS (ALT 637 FOR MEDICARE OP, ALT 636 FOR OP/ED): Mod: MUE | Performed by: INTERNAL MEDICINE

## 2024-04-22 PROCEDURE — 2500000001 HC RX 250 WO HCPCS SELF ADMINISTERED DRUGS (ALT 637 FOR MEDICARE OP): Performed by: NURSE PRACTITIONER

## 2024-04-22 PROCEDURE — 2500000004 HC RX 250 GENERAL PHARMACY W/ HCPCS (ALT 636 FOR OP/ED): Performed by: NURSE PRACTITIONER

## 2024-04-22 PROCEDURE — 1100000001 HC PRIVATE ROOM DAILY

## 2024-04-22 PROCEDURE — 9420000001 HC RT PATIENT EDUCATION 5 MIN

## 2024-04-22 PROCEDURE — 2500000002 HC RX 250 W HCPCS SELF ADMINISTERED DRUGS (ALT 637 FOR MEDICARE OP, ALT 636 FOR OP/ED): Performed by: INTERNAL MEDICINE

## 2024-04-22 PROCEDURE — 94760 N-INVAS EAR/PLS OXIMETRY 1: CPT | Mod: MUE

## 2024-04-22 RX ADMIN — Medication: at 23:00

## 2024-04-22 RX ADMIN — PIPERACILLIN SODIUM AND TAZOBACTAM SODIUM 4.5 G: 4; .5 INJECTION, SOLUTION INTRAVENOUS at 08:54

## 2024-04-22 RX ADMIN — IPRATROPIUM BROMIDE AND ALBUTEROL SULFATE 3 ML: 2.5; .5 SOLUTION RESPIRATORY (INHALATION) at 12:15

## 2024-04-22 RX ADMIN — PIPERACILLIN SODIUM AND TAZOBACTAM SODIUM 4.5 G: 4; .5 INJECTION, SOLUTION INTRAVENOUS at 01:21

## 2024-04-22 RX ADMIN — HYDROXYCHLOROQUINE SULFATE 200 MG: 200 TABLET ORAL at 20:48

## 2024-04-22 RX ADMIN — AZITHROMYCIN MONOHYDRATE 500 MG: 500 INJECTION, POWDER, LYOPHILIZED, FOR SOLUTION INTRAVENOUS at 09:32

## 2024-04-22 RX ADMIN — NABUMETONE 750 MG: 750 TABLET, FILM COATED ORAL at 20:48

## 2024-04-22 RX ADMIN — NABUMETONE 750 MG: 750 TABLET, FILM COATED ORAL at 08:55

## 2024-04-22 RX ADMIN — Medication: at 07:37

## 2024-04-22 RX ADMIN — ENOXAPARIN SODIUM 40 MG: 40 INJECTION SUBCUTANEOUS at 16:57

## 2024-04-22 RX ADMIN — IPRATROPIUM BROMIDE AND ALBUTEROL SULFATE 3 ML: 2.5; .5 SOLUTION RESPIRATORY (INHALATION) at 07:37

## 2024-04-22 RX ADMIN — Medication: at 15:43

## 2024-04-22 RX ADMIN — HYDROXYCHLOROQUINE SULFATE 200 MG: 200 TABLET ORAL at 08:55

## 2024-04-22 RX ADMIN — IPRATROPIUM BROMIDE AND ALBUTEROL SULFATE 3 ML: 2.5; .5 SOLUTION RESPIRATORY (INHALATION) at 19:01

## 2024-04-22 RX ADMIN — PIPERACILLIN SODIUM AND TAZOBACTAM SODIUM 4.5 G: 4; .5 INJECTION, SOLUTION INTRAVENOUS at 20:48

## 2024-04-22 RX ADMIN — METHYLPREDNISOLONE SODIUM SUCCINATE 40 MG: 40 INJECTION, POWDER, FOR SOLUTION INTRAMUSCULAR; INTRAVENOUS at 06:15

## 2024-04-22 RX ADMIN — PIPERACILLIN SODIUM AND TAZOBACTAM SODIUM 4.5 G: 4; .5 INJECTION, SOLUTION INTRAVENOUS at 14:23

## 2024-04-22 RX ADMIN — PANTOPRAZOLE SODIUM 40 MG: 40 TABLET, DELAYED RELEASE ORAL at 06:15

## 2024-04-22 SDOH — ECONOMIC STABILITY: FOOD INSECURITY: WITHIN THE PAST 12 MONTHS, YOU WORRIED THAT YOUR FOOD WOULD RUN OUT BEFORE YOU GOT MONEY TO BUY MORE.: NEVER TRUE

## 2024-04-22 SDOH — SOCIAL STABILITY: SOCIAL NETWORK
IN A TYPICAL WEEK, HOW MANY TIMES DO YOU TALK ON THE PHONE WITH FAMILY, FRIENDS, OR NEIGHBORS?: MORE THAN THREE TIMES A WEEK

## 2024-04-22 SDOH — HEALTH STABILITY: MENTAL HEALTH
STRESS IS WHEN SOMEONE FEELS TENSE, NERVOUS, ANXIOUS, OR CAN'T SLEEP AT NIGHT BECAUSE THEIR MIND IS TROUBLED. HOW STRESSED ARE YOU?: NOT AT ALL

## 2024-04-22 SDOH — HEALTH STABILITY: MENTAL HEALTH
HOW OFTEN DO YOU NEED TO HAVE SOMEONE HELP YOU WHEN YOU READ INSTRUCTIONS, PAMPHLETS, OR OTHER WRITTEN MATERIAL FROM YOUR DOCTOR OR PHARMACY?: NEVER

## 2024-04-22 SDOH — SOCIAL STABILITY: SOCIAL INSECURITY: WITHIN THE LAST YEAR, HAVE YOU BEEN AFRAID OF YOUR PARTNER OR EX-PARTNER?: NO

## 2024-04-22 SDOH — ECONOMIC STABILITY: FOOD INSECURITY: WITHIN THE PAST 12 MONTHS, THE FOOD YOU BOUGHT JUST DIDN'T LAST AND YOU DIDN'T HAVE MONEY TO GET MORE.: NEVER TRUE

## 2024-04-22 SDOH — ECONOMIC STABILITY: INCOME INSECURITY: IN THE PAST 12 MONTHS, HAS THE ELECTRIC, GAS, OIL, OR WATER COMPANY THREATENED TO SHUT OFF SERVICE IN YOUR HOME?: NO

## 2024-04-22 SDOH — SOCIAL STABILITY: SOCIAL INSECURITY
WITHIN THE LAST YEAR, HAVE YOU BEEN KICKED, HIT, SLAPPED, OR OTHERWISE PHYSICALLY HURT BY YOUR PARTNER OR EX-PARTNER?: NO

## 2024-04-22 SDOH — SOCIAL STABILITY: SOCIAL NETWORK
DO YOU BELONG TO ANY CLUBS OR ORGANIZATIONS SUCH AS CHURCH GROUPS UNIONS, FRATERNAL OR ATHLETIC GROUPS, OR SCHOOL GROUPS?: PATIENT DECLINED

## 2024-04-22 SDOH — SOCIAL STABILITY: SOCIAL NETWORK: HOW OFTEN DO YOU ATTENT MEETINGS OF THE CLUB OR ORGANIZATION YOU BELONG TO?: PATIENT DECLINED

## 2024-04-22 SDOH — SOCIAL STABILITY: SOCIAL NETWORK: ARE YOU MARRIED, WIDOWED, DIVORCED, SEPARATED, NEVER MARRIED, OR LIVING WITH A PARTNER?: MARRIED

## 2024-04-22 SDOH — HEALTH STABILITY: PHYSICAL HEALTH: ON AVERAGE, HOW MANY DAYS PER WEEK DO YOU ENGAGE IN MODERATE TO STRENUOUS EXERCISE (LIKE A BRISK WALK)?: 0 DAYS

## 2024-04-22 SDOH — SOCIAL STABILITY: SOCIAL INSECURITY: WITHIN THE LAST YEAR, HAVE YOU BEEN HUMILIATED OR EMOTIONALLY ABUSED IN OTHER WAYS BY YOUR PARTNER OR EX-PARTNER?: NO

## 2024-04-22 SDOH — SOCIAL STABILITY: SOCIAL NETWORK: HOW OFTEN DO YOU ATTEND CHURCH OR RELIGIOUS SERVICES?: PATIENT DECLINED

## 2024-04-22 SDOH — SOCIAL STABILITY: SOCIAL INSECURITY
WITHIN THE LAST YEAR, HAVE TO BEEN RAPED OR FORCED TO HAVE ANY KIND OF SEXUAL ACTIVITY BY YOUR PARTNER OR EX-PARTNER?: NO

## 2024-04-22 SDOH — HEALTH STABILITY: PHYSICAL HEALTH: ON AVERAGE, HOW MANY MINUTES DO YOU ENGAGE IN EXERCISE AT THIS LEVEL?: 0 MIN

## 2024-04-22 SDOH — SOCIAL STABILITY: SOCIAL NETWORK: HOW OFTEN DO YOU GET TOGETHER WITH FRIENDS OR RELATIVES?: MORE THAN THREE TIMES A WEEK

## 2024-04-22 ASSESSMENT — PAIN SCALES - GENERAL
PAINLEVEL_OUTOF10: 0 - NO PAIN

## 2024-04-22 ASSESSMENT — PAIN - FUNCTIONAL ASSESSMENT: PAIN_FUNCTIONAL_ASSESSMENT: 0-10

## 2024-04-22 ASSESSMENT — COGNITIVE AND FUNCTIONAL STATUS - GENERAL
DAILY ACTIVITIY SCORE: 24
MOBILITY SCORE: 24
MOBILITY SCORE: 24
DAILY ACTIVITIY SCORE: 24

## 2024-04-22 NOTE — NURSING NOTE
COPD education/book given to patient. Discussed importance of taking medications as prescribed/following up with physician appointments. Discussed triggers of COPD (smoking, stress/anxiety, pet dander, weather, perfumes...). Discussed smoking cessation. Pt. Smokes 2-3 cigarettes/day. Pulmonary rehab education given to patient.

## 2024-04-22 NOTE — PROGRESS NOTES
Vancomycin Dosing by Pharmacy- Cessation of Therapy    Consult to pharmacy for vancomycin dosing has been discontinued by the prescriber, pharmacy will sign off at this time.    Please call pharmacy if there are further questions or re-enter a consult if vancomycin is resumed.     MELBA WINTER, PharmD

## 2024-04-22 NOTE — PROGRESS NOTES
Pulmonary Progress Note    Dipesh Rousseau is a 69 y.o. male on day 2 of admission presenting with COPD exacerbation (Multi).    Subjective   No acute events  Objective   Vital Signs      4/21/2024     3:02 AM 4/21/2024     7:24 AM 4/21/2024    11:22 AM 4/21/2024     4:30 PM 4/21/2024     6:57 PM 4/21/2024    11:05 PM 4/22/2024     6:59 AM   Vitals   Systolic 133 125 126 127 123 147 128   Diastolic 72 79 82 80 69 82 80   Heart Rate 62 60 67 81 74 74 65   Temp 36 °C (96.8 °F) 36.9 °C (98.4 °F) 36.6 °C (97.9 °F) 36.9 °C (98.4 °F) 36.5 °C (97.7 °F) 36.3 °C (97.3 °F) 36.5 °C (97.7 °F)   Resp 25 18 16 20 20 22 18       Oxygen Therapy  SpO2: 97 %  Medical Gas Therapy: Supplemental oxygen  O2 Delivery Method: (S) Nasal cannula (5L-WEANED TO 3L)    FiO2 (%):  [40 %] 40 %    Intake/Output previous 24 hours:    Intake/Output Summary (Last 24 hours) at 4/22/2024 1030  Last data filed at 4/22/2024 0924  Gross per 24 hour   Intake 2150 ml   Output --   Net 2150 ml       Physical Exam  Vitals reviewed.   Constitutional:       Appearance: Normal appearance.   HENT:      Head: Normocephalic and atraumatic.      Mouth/Throat:      Mouth: Mucous membranes are moist.   Eyes:      Extraocular Movements: Extraocular movements intact.      Pupils: Pupils are equal, round, and reactive to light.   Cardiovascular:      Rate and Rhythm: Normal rate and regular rhythm.   Pulmonary:      Effort: Pulmonary effort is normal.      Breath sounds: Normal breath sounds and air entry.   Abdominal:      General: Abdomen is flat. Bowel sounds are normal.      Palpations: Abdomen is soft.   Musculoskeletal:         General: Normal range of motion.      Cervical back: Normal range of motion and neck supple.   Skin:     General: Skin is warm and dry.   Neurological:      General: No focal deficit present.      Mental Status: He is alert and oriented to person, place, and time. Mental status is at baseline.       ...  Lines and Tubes:  Peripheral IV  04/22/24 20 G Right;Posterior Forearm (Active)   Placement Date/Time: 04/22/24 1015   Size (Gauge): 20 G  Orientation: Right;Posterior  Location: Forearm  Site Prep: Alcohol  Insertion attempts: 1   Number of days: 0         Scheduled medications  azithromycin, 500 mg, intravenous, q24h  enoxaparin, 40 mg, subcutaneous, q24h  hydroxychloroquine, 200 mg, oral, BID  ipratropium-albuteroL, 3 mL, nebulization, TID  methylPREDNISolone sodium succinate (PF), 40 mg, intravenous, q8h MIGUEL  nabumetone, 750 mg, oral, BID  oxygen, , inhalation, q8h  pantoprazole, 40 mg, oral, Daily before breakfast  piperacillin-tazobactam, 4.5 g, intravenous, q6h      Continuous medications     PRN medications  PRN medications: acetaminophen **OR** acetaminophen **OR** acetaminophen, alum-mag hydroxide-simeth, ipratropium-albuteroL, ondansetron ODT **OR** ondansetron, polyethylene glycol    Relevant Results  Results from last 7 days   Lab Units 04/21/24  0450 04/20/24  1240   WBC AUTO x10*3/uL 15.2* 18.8*   HEMOGLOBIN g/dL 13.2* 15.4   HEMATOCRIT % 39.1* 45.9   PLATELETS AUTO x10*3/uL 102* 139*      Results from last 7 days   Lab Units 04/21/24  0450 04/20/24  1240   SODIUM mmol/L 136 138   POTASSIUM mmol/L 3.8 3.7   CHLORIDE mmol/L 103 102   CO2 mmol/L 24 25   BUN mg/dL 15 14   CREATININE mg/dL 0.90 1.00   GLUCOSE mg/dL 165* 127*   CALCIUM mg/dL 8.5 8.8      Results from last 7 days   Lab Units 04/20/24  1243   POCT PH, ARTERIAL pH 7.50*   POCT PCO2, ARTERIAL mm Hg 31*   POCT PO2, ARTERIAL mm Hg 96*   POCT HCO3 CALCULATED, ARTERIAL mmol/L 24.2   POCT BASE EXCESS, ARTERIAL mmol/L 1.8     CT angio chest for pulmonary embolism 04/20/2024    Narrative  Interpreted By:  Rigo Garrett,  STUDY:  CT ANGIO CHEST FOR PULMONARY EMBOLISM;  4/20/2024 4:33 pm    INDICATION:  Signs/Symptoms:PE Protocol.    COMPARISON:  None.    ACCESSION NUMBER(S):  [id number]    ORDERING CLINICIAN:  MAKAYLA PETERSEN    TECHNIQUE:  Helical data acquisition of the chest was  obtained after IV injection  of  100 ml of  Omnipaque 350. Images were reformatted in axial,  coronal, and sagittal planes. 3D reconstructed MIPS volumetric images  were also generated at an independent workstation and reviewed.    FINDINGS:  POTENTIAL LIMITATIONS OF THE STUDY: None    HEART AND VESSELS:  No discrete filling defects within the main pulmonary artery or its  branches.    The thoracic aorta is of normal course and caliber without aneurysm,  dissection or significant atherosclerotic calcification.    No coronary artery calcifications are seen. The study is not  optimized for evaluation of coronary arteries.    The cardiac chambers are not enlarged.    MEDIASTINUM AND MATEUS, LOWER NECK AND AXILLA:  Enlargement and heterogeneity of the thyroid indicating goiter, with  the largest nodule on the left measuring approximately 2.6 cm, and  with several punctate calcifications. This also extends substernally.    There are several borderline mediastinal nodes most likely from  reactive hypertrophy. No significant hilar adenopathy.    LUNGS AND AIRWAYS:    Mild bronchial wall thickening extends into the posterior basal  segments with some parenchymal reticular and ground-glass  attenuation, and tree-in-bud nodularity best seen in the left lower  lung posteriorly.. This is probably from chronic bronchitis with  bronchiolitis and peripheral mucous plugs. Bronchiectasis and wall  thickening indicating bronchitis also extends into the middle lobe.  There is a more discrete 1.1 cm nodular opacity at the right base  posteromedially on image 227 of series 11. This may be due to focal  atelectasis, but a parenchymal nodule is possible. Follow-up is  therefore recommended as: Incidental Finding:  A solid non-calcified  pulmonary nodule measuring greater than 8 mm.  (**-YCF-**)    Instructions:  Consider short term follow up non-contrast Chest CT at  3 months, PET/CT or tissue sampling. Note, PET/CT may be limited in  low  grade malignancy, nodules <1 cm size or those located close to  diaphragm. (Luiz Lewis et al., Guidelines for management of  incidental pulmonary nodules detected on CT images: From the  Fleischner Society 2017, Radiology. 2017 Jul;284 (1):228-243.)  [id number]    No pleural effusion.    UPPER ABDOMEN AND OTHER FINDINGS:  The visualized subdiaphragmatic structures demonstrate no remarkable  findings.    CHEST WALL AND OSSEOUS STRUCTURES:  There are no suspicious osseous lesions.    Impression  1.  No evidence of pulmonary embolus, aneurysm or dissection.  2.  Bronchitis probably with peripheral mucous plugs, bronchiolitis  and possibly focal pneumonia of the lower lungs and middle lobe as  described.  3. Possible right lower lung nodule as described with recommendation  for follow-up.  4. Borderline probable reactive mediastinal nodes.  5. Goiter.    Signed by: Rigo Garrett 4/20/2024 5:23 PM  Dictation workstation:   [id number]      Patient Active Problem List   Diagnosis    CPDD (calcium pyrophosphate deposition disease)    Arthritis of foot    Ankle arthritis    Immunocompromised patient (Multi)    Insomnia    Male erectile disorder of organic origin    Nicotine dependence, cigarettes, uncomplicated    Osteoarthritis of multiple joints    Prostate cancer (Multi)    Reaction to QuantiFERON-TB test    Restless legs syndrome    Rheumatoid arthritis (Multi)    Wrist arthritis    Shortness of breath at rest    Medicare annual wellness visit, subsequent    Mild cognitive impairment    Gastroesophageal reflux disease with esophagitis without hemorrhage    Hepatic steatosis    COPD exacerbation (Multi)     Assessment/Plan   Principal Problem:    COPD exacerbation (Multi)  Abnormal CT  Lung nodule vs infiltrate  Acute resp failure     Copd exac  Antibiotics  Steroids - taper  Bronchodilators     CT reviewed and discussed with patient  RLL nodule. Suspect infectious but need to r/o malignancy.  Follow up CT in  6 weeks.    Mohit Larkin MD  [facility]

## 2024-04-22 NOTE — CARE PLAN
The patient's goals for the shift include      The clinical goals for the shift include Monitor oxygen demand, IV antibotics      Problem: Respiratory  Goal: Clear secretions with interventions this shift  Outcome: Progressing  Goal: No signs of respiratory distress (eg. Use of accessory muscles. Peds grunting)  Outcome: Progressing  Goal: Verbalize decreased shortness of breath this shift  Outcome: Progressing  Goal: Wean oxygen to maintain O2 saturation per order/standard this shift  Outcome: Progressing     Problem: Pain - Adult  Goal: Verbalizes/displays adequate comfort level or baseline comfort level  Outcome: Progressing     Problem: Safety - Adult  Goal: Free from fall injury  Outcome: Progressing

## 2024-04-22 NOTE — CARE PLAN
Problem: Respiratory  Goal: Clear secretions with interventions this shift  Outcome: Progressing  Goal: No signs of respiratory distress (eg. Use of accessory muscles. Peds grunting)  Outcome: Progressing  Goal: Verbalize decreased shortness of breath this shift  Outcome: Progressing  Goal: Wean oxygen to maintain O2 saturation per order/standard this shift  Outcome: Progressing     Problem: Pain - Adult  Goal: Verbalizes/displays adequate comfort level or baseline comfort level  Outcome: Progressing  Flowsheets (Taken 4/21/2024 2334)  Verbalizes/displays adequate comfort level or baseline comfort level:   Encourage patient to monitor pain and request assistance   Assess pain using appropriate pain scale   Implement non-pharmacological measures as appropriate and evaluate response   Administer analgesics based on type and severity of pain and evaluate response     Problem: Safety - Adult  Goal: Free from fall injury  Outcome: Progressing  Flowsheets (Taken 4/21/2024 2334)  Free from fall injury:   Instruct family/caregiver on patient safety   Based on caregiver fall risk screen, instruct family/caregiver to ask for assistance with transferring infant if caregiver noted to have fall risk factors     Problem: Discharge Planning  Goal: Discharge to home or other facility with appropriate resources  Outcome: Progressing  Flowsheets (Taken 4/21/2024 2334)  Discharge to home or other facility with appropriate resources:   Identify barriers to discharge with patient and caregiver   Identify discharge learning needs (meds, wound care, etc)     Problem: Chronic Conditions and Co-morbidities  Goal: Patient's chronic conditions and co-morbidity symptoms are monitored and maintained or improved  Outcome: Progressing  Flowsheets (Taken 4/21/2024 2334)  Care Plan - Patient's Chronic Conditions and Co-Morbidity Symptoms are Monitored and Maintained or Improved: Monitor and assess patient's chronic conditions and comorbid  symptoms for stability, deterioration, or improvement   The patient's goals for the shift include  decreased shortness of breath, rest    The clinical goals for the shift include Monitor oxygen demand, IV antibotics      04/21/24 at 11:35 PM - Carla Mallory RN

## 2024-04-22 NOTE — PROGRESS NOTES
04/22/24 1134   Discharge Planning   Living Arrangements Spouse/significant other   Support Systems Spouse/significant other   Assistance Needed independent   Type of Residence Private residence   Number of Stairs to Enter Residence 5   Number of Stairs Within Residence 15   Do you have animals or pets at home? No   Who is requesting discharge planning? Provider   Home or Post Acute Services None   Patient expects to be discharged to: home   Does the patient need discharge transport arranged? No   Patient Choice   Patient / Family choosing to utilize agency / facility established prior to hospitalization No     Home no needs upon discharge.

## 2024-04-22 NOTE — PROGRESS NOTES
"Dipesh Rousseau is a 69 y.o. male on day 2 of admission presenting with COPD exacerbation (Multi).    Subjective   HPI   Patient seen and examined, complains with  Patient denies any chest pain, shortness of breath in room air.  Patient tolerates well her diet with no nausea vomiting or abdominal pain.  No fever or chills.  No headache or dizziness.      Objective     Last Recorded Vitals  Blood pressure 128/80, pulse 65, temperature 36.5 °C (97.7 °F), temperature source Temporal, resp. rate 18, height 1.702 m (5' 7\"), weight 97.5 kg (215 lb), SpO2 97%.      Intake/Output Summary (Last 24 hours) at 4/22/2024 0953  Last data filed at 4/22/2024 0924  Gross per 24 hour   Intake 1850 ml   Output --   Net 1850 ml         Physical Exam   General: alert, no diaphoresis   HENT: mucous membranes moist, external ears normal, no rhinorrhea   Eyes: no icterus or injection, no discharge   Lungs: CTA BL   Heart: RRR, no murmurs, no LE edema BL   GI: abdomen soft, nontender, nondistended, BS present   MSK: no joint effusion or deformity   Skin: no rashes, erythema, or ecchymosis   Neuro: grossly normal cognition, motor strength, sensation   Relevant Results    Lab Results   Component Value Date    WBC 15.2 (H) 04/21/2024    HGB 13.2 (L) 04/21/2024    HCT 39.1 (L) 04/21/2024    MCV 91 04/21/2024     (L) 04/21/2024       Lab Results   Component Value Date    GLUCOSE 165 (H) 04/21/2024    CALCIUM 8.5 04/21/2024     04/21/2024    K 3.8 04/21/2024    CO2 24 04/21/2024     04/21/2024    BUN 15 04/21/2024    CREATININE 0.90 04/21/2024       No results found for: \"HGBA1C\"    CT angio chest for pulmonary embolism    Result Date: 4/20/2024  Interpreted By:  Rigo Garrett, STUDY: CT ANGIO CHEST FOR PULMONARY EMBOLISM;  4/20/2024 4:33 pm   INDICATION: Signs/Symptoms:PE Protocol.   COMPARISON: None.   ACCESSION NUMBER(S): [id number]   ORDERING CLINICIAN: MAKAYLA PETERSEN   TECHNIQUE: Helical data acquisition of the chest was " obtained after IV injection of  100 ml of  Omnipaque 350. Images were reformatted in axial, coronal, and sagittal planes. 3D reconstructed MIPS volumetric images were also generated at an independent workstation and reviewed.   FINDINGS: POTENTIAL LIMITATIONS OF THE STUDY: None   HEART AND VESSELS: No discrete filling defects within the main pulmonary artery or its branches.   The thoracic aorta is of normal course and caliber without aneurysm, dissection or significant atherosclerotic calcification.   No coronary artery calcifications are seen. The study is not optimized for evaluation of coronary arteries.   The cardiac chambers are not enlarged.   MEDIASTINUM AND MATEUS, LOWER NECK AND AXILLA: Enlargement and heterogeneity of the thyroid indicating goiter, with the largest nodule on the left measuring approximately 2.6 cm, and with several punctate calcifications. This also extends substernally.   There are several borderline mediastinal nodes most likely from reactive hypertrophy. No significant hilar adenopathy.   LUNGS AND AIRWAYS:   Mild bronchial wall thickening extends into the posterior basal segments with some parenchymal reticular and ground-glass attenuation, and tree-in-bud nodularity best seen in the left lower lung posteriorly.. This is probably from chronic bronchitis with bronchiolitis and peripheral mucous plugs. Bronchiectasis and wall thickening indicating bronchitis also extends into the middle lobe. There is a more discrete 1.1 cm nodular opacity at the right base posteromedially on image 227 of series 11. This may be due to focal atelectasis, but a parenchymal nodule is possible. Follow-up is therefore recommended as: Incidental Finding:  A solid non-calcified pulmonary nodule measuring greater than 8 mm.  (**-YCF-**)   Instructions:  Consider short term follow up non-contrast Chest CT at 3 months, PET/CT or tissue sampling. Note, PET/CT may be limited in low grade malignancy, nodules <1 cm  size or those located close to diaphragm. (Luiz Lewis et al., Guidelines for management of incidental pulmonary nodules detected on CT images: From the Fleischner Society 2017, Radiology. 2017 Jul;284 (1):228-243.) [id number]   No pleural effusion.   UPPER ABDOMEN AND OTHER FINDINGS: The visualized subdiaphragmatic structures demonstrate no remarkable findings.   CHEST WALL AND OSSEOUS STRUCTURES: There are no suspicious osseous lesions.       1.  No evidence of pulmonary embolus, aneurysm or dissection. 2.  Bronchitis probably with peripheral mucous plugs, bronchiolitis and possibly focal pneumonia of the lower lungs and middle lobe as described. 3. Possible right lower lung nodule as described with recommendation for follow-up. 4. Borderline probable reactive mediastinal nodes. 5. Goiter.   Signed by: Rigo Garrett 4/20/2024 5:23 PM Dictation workstation:   [id number]    XR chest 1 view    Result Date: 4/20/2024  Interpreted By:  Velasquez Shanks, STUDY: XR CHEST 1 VIEW;  4/20/2024 1:25 pm   INDICATION: Signs/Symptoms:SOB, hypoxia.   COMPARISON: 10/25/2021   ACCESSION NUMBER(S): [id number]   ORDERING CLINICIAN: ADNRES CEJA   FINDINGS: No focal infiltrate. No apparent pleural effusion. Cardiomediastinal silhouette unchanged. Mild pulmonary vascular congestion.       Mild pulmonary vascular congestion.   MACRO: None   Signed by: Velasquez Shanks 4/20/2024 1:29 PM Dictation workstation:   [id number]    CT kidney w and wo IV contrast    Result Date: 4/15/2024  * * *Final Report* * * DATE OF EXAM: Apr 11 2024  9:05AM   [facility]   0546  -  CT KIDNEY WO/W IVCON  / ACCESSION #  [id number] PROCEDURE REASON: Acquired cyst of kidney      * * * * Physician Interpretation * * * *  EXAMINATION:  CT ABDOMEN (KIDNEY) WITHOUT AND WITH IV CONTRAST CLINICAL HISTORY: LEFT renal cyst noted on ultrasound. TECHNIQUE: Spiral imaging in three phases through the kidneys and including the abdomen was performed utilizing IV contrast  only. No oral contrast was given. Arterial phase MIP, and nephrographic phase oblique coronal and sagittal reformations were created from thin-slice images under physician supervision on the imaging modality workstation. MQ:  CTKAWWO_1 Contrast: IV:  120 ml of Omnipaque 350 Oral Contrast: None CT Radiation dose: Integrated dose-length product (DLP) for this visit =   965 mGy*cm. CT Dose Reduction Employed: Automated exposure control (AEC) COMPARISON: Ultrasound 03/27/2024. RESULT: RIGHT kidney: No cystic or solid mass, calculus or hydronephrosis. LEFT kidney: 1.5 cm simple cyst (Bosniak one) mostly exophytic from the interpolar region corresponding to the lesion on ultrasound.  No other cystic or solid mass, calculus or hydronephrosis. Abdomen (other): Liver: No mass. Biliary: No bile duct dilation.  Gallbladder is unremarkable. Spleen: No mass. No splenomegaly. Pancreas: No mass or duct dilation. GI tract: No dilation or wall thickening. Small hiatal hernia. Lymph nodes (other): No abdominal lymphadenopathy. Mesentery/Peritoneum: No ascites or mass. Retroperitoneum: No mass. Vasculature:  - Abdominal aorta and iliac arteries: Dense aortic atherosclerotic calcifications.  No aneurysm.  - Celiac and SMA: Patent.  - Portal venous system: Patent:  - Hepatic veins: Patent. Bones/Soft Tissues: Small RIGHT diaphragmatic Bochdalek hernia containing only fat. Lower thorax: Areas of fibrosis or atelectasis in the RIGHT middle lobe and RIGHT lower lobe posteriorly. Localizer images: No additional findings.    IMPRESSION: Benign-appearing LEFT renal cyst (Bosniak 1) corresponding to the ultrasound findings, no imaging follow-up is indicated. : PSCB   Transcribe Date/Time: Apr 15 2024  2:26P Dictated by : NATANAEL BRANDON MD This examination was interpreted and the report reviewed and electronically signed by: NATANAEL BRANDON MD on Apr 15 2024  2:36PM  EST    XR ABDOMEN 3V KUB W/OBLIQUES    Result Date: 4/2/2024  * *  *Final Report* * * DATE OF EXAM: Mar 27 2024 11:21AM   [facility]   5358  -  XR ABDOMEN 3V KUB W/OBLIQUES  / ACCESSION #  [id number] PROCEDURE REASON: Pain of upper abdomen      * * * * Physician Interpretation * * * *  XR ABDOMEN 3V KUB W/OBLIQUES CLINICAL INFORMATION: ,Pain of upper abdomen 69 years Male COMPARISON: None available RESULT: See impression    IMPRESSION: Gas is noted in large and small bowel loops in a nonspecific pattern. Prominent stool noted within the colon.  No discrete calcific densities noted within the right and left flank.  Calcifications in the pelvis have the appearance of phleboliths.  Degenerative spine changes are noted. : PSCB   Transcribe Date/Time: Apr 2 2024  6:46P Dictated by : KRISTA JIMENEZ MD This examination was interpreted and the report reviewed and electronically signed by: KRISTA JIMENEZ MD on Apr 2 2024  6:47PM  EST    US gallbladder    Result Date: 3/29/2024  * * *Final Report* * * DATE OF EXAM: Mar 27 2024 11:14AM   [facility]   1032  -  US ABD RIGHT UPPER QUADRANT  / ACCESSION #  [id number] PROCEDURE REASON: Pain of upper abdomen      * * * * Physician Interpretation * * * *  EXAMINATION:   RIGHT UPPER QUADRANT AND SPLEEN ULTRASOUND CLINICAL HISTORY: Upper abdominal pain. TECHNIQUE:  Sonography of the right upper quadrant and spleen was performed.  Images were obtained and stored in a permanent archive. MQ:  URUQ_2 COMPARISON: None.   RESULT: Pancreas: Normal sonographic appearance.    Portions obscured: tail Liver:      Echotexture:  Normal, homogeneous.      Echogenicity:  Normal      Surface contour:  Smooth      Lesions:  None. Biliary: No intrahepatic biliary duct dilation.      CBD: 0.6 cm at the hilum.      Gallbladder: Normal caliber           -Contents:  No cholelithiasis           -Wall:  Normal           -Other:  No pericholecystic fluid. Kidneys: Limited analysis of the right kidney secondary to overlying bowel gas.  No hydronephrosis.  1.7 cm left  lateral renal cyst. Ascites: None. Spleen: The craniocaudal length of the spleen is 10.6 cm, normal.  There are no splenic lesions.    IMPRESSION: Hepatic steatosis. 1.7 cm left renal cyst. Unremarkable spleen. : BALTAZAR   Transcribe Date/Time: Mar 29 2024  7:16A Dictated by : SHARON SORTO MD This examination was interpreted and the report reviewed and electronically signed by: SHARON SORTO MD on Mar 29 2024  7:18AM  EST    US ABD SPLEEN - NB    Result Date: 3/29/2024  * * *Final Report* * * DATE OF EXAM: Mar 27 2024 11:14AM   [facility]   1232  -  US ABD SPLEEN -NB  / ACCESSION #  [id number] PROCEDURE REASON: Pain of upper abdomen      * * * * Physician Interpretation * * * *  EXAMINATION:   RIGHT UPPER QUADRANT AND SPLEEN ULTRASOUND CLINICAL HISTORY: Upper abdominal pain. TECHNIQUE:  Sonography of the right upper quadrant and spleen was performed.  Images were obtained and stored in a permanent archive. MQ:  URUQ_2 COMPARISON: None. RESULT: Pancreas: Normal sonographic appearance.    Portions obscured: tail Liver:      Echotexture:  Normal, homogeneous.      Echogenicity:  Normal      Surface contour:  Smooth      Lesions:  None. Biliary: No intrahepatic biliary duct dilation.       CBD: 0.6 cm at the hilum.      Gallbladder: Normal caliber           -Contents:  No cholelithiasis           -Wall:  Normal           -Other:  No pericholecystic fluid. Kidneys: Limited analysis of the right kidney secondary to overlying bowel gas.  No hydronephrosis.  1.7 cm left lateral renal cyst. Ascites: None. Spleen: The craniocaudal length of the spleen is 10.6 cm, normal.  There are no splenic lesions.    IMPRESSION: Hepatic steatosis. 1.7 cm left renal cyst. Unremarkable spleen. : MaxCDN   Transcribe Date/Time: Mar 29 2024  7:16A Dictated by : SHARON SORTO MD This examination was interpreted and the report reviewed and electronically signed by: SHARON SORTO MD on Mar 29 2024  7:18AM  EST    Scheduled  medications  azithromycin, 500 mg, intravenous, q24h  enoxaparin, 40 mg, subcutaneous, q24h  hydroxychloroquine, 200 mg, oral, BID  ipratropium-albuteroL, 3 mL, nebulization, TID  methylPREDNISolone sodium succinate (PF), 40 mg, intravenous, q8h MIGUEL  nabumetone, 750 mg, oral, BID  oxygen, , inhalation, q8h  pantoprazole, 40 mg, oral, Daily before breakfast  piperacillin-tazobactam, 4.5 g, intravenous, q6h      Continuous medications     PRN medications  PRN medications: acetaminophen **OR** acetaminophen **OR** acetaminophen, alum-mag hydroxide-simeth, ipratropium-albuteroL, ondansetron ODT **OR** ondansetron, polyethylene glycol, vancomycin    Assessment/Plan   Principal Problem:  Acute COPD exacerbation   - Denies any shortness of breath on 3 L nasal cannula normally does not wear oxygen at home.  -pulmonology consult  -IV solumedrol, antibiotic and bronchodilators  -CT chest no PE with right lower lobe nodule suspect infection versus malignancy.  Patient has to follow-up CT in 6-week        Acute Respiratory Failure with hypoxia  -oxygen PRN, wean as tolerated     SIRS (tachycardia, tachypnea, leukocytosis)  -empiric ATB  pt immunocompromised from RA meds  - improved      Tobacco Use  -encourage cessation     GERD   -continue PPI     RA  -continue home meds         Discharge plan:  69 years old male presented with dyspnea f admitted with acute COPD exacerbation with acute respiratory failure with hypoxia.  Since normally does not wear oxygen and now he is on 3 L nasal cannula.  Will keep weaning him.  Did not treated with IV steroid antibiotic.  Patient has to follow-up CT in 6-week due to right lower lobe lung nodule.     discussed patient's nurse to wean patient from O2 as tolerated and keep SpO2 above 90%       Anticipate discharging patient home  when medically clear by pulmonary Dr.    I spent 35 minutes in the professional and overall care of this patient.    Malu Lopez, SAAD-CNP

## 2024-04-22 NOTE — CARE PLAN
Problem: Respiratory  Goal: No signs of respiratory distress (eg. Use of accessory muscles. Peds grunting)  Outcome: Progressing  Goal: Verbalize decreased shortness of breath this shift  Outcome: Progressing  Goal: Wean oxygen to maintain O2 saturation per order/standard this shift  Outcome: Progressing  Goal: Minimal/no exertional discomfort or dyspnea this shift  Outcome: Progressing

## 2024-04-23 PROCEDURE — 9420000001 HC RT PATIENT EDUCATION 5 MIN

## 2024-04-23 PROCEDURE — 94640 AIRWAY INHALATION TREATMENT: CPT

## 2024-04-23 PROCEDURE — 2500000002 HC RX 250 W HCPCS SELF ADMINISTERED DRUGS (ALT 637 FOR MEDICARE OP, ALT 636 FOR OP/ED): Mod: MUE | Performed by: INTERNAL MEDICINE

## 2024-04-23 PROCEDURE — 2500000001 HC RX 250 WO HCPCS SELF ADMINISTERED DRUGS (ALT 637 FOR MEDICARE OP): Performed by: NURSE PRACTITIONER

## 2024-04-23 PROCEDURE — 94668 MNPJ CHEST WALL SBSQ: CPT

## 2024-04-23 PROCEDURE — 2500000004 HC RX 250 GENERAL PHARMACY W/ HCPCS (ALT 636 FOR OP/ED): Performed by: NURSE PRACTITIONER

## 2024-04-23 PROCEDURE — 1100000001 HC PRIVATE ROOM DAILY

## 2024-04-23 PROCEDURE — 2500000002 HC RX 250 W HCPCS SELF ADMINISTERED DRUGS (ALT 637 FOR MEDICARE OP, ALT 636 FOR OP/ED): Performed by: INTERNAL MEDICINE

## 2024-04-23 PROCEDURE — 2500000005 HC RX 250 GENERAL PHARMACY W/O HCPCS: Performed by: INTERNAL MEDICINE

## 2024-04-23 RX ORDER — PREDNISONE 20 MG/1
40 TABLET ORAL DAILY
Status: DISCONTINUED | OUTPATIENT
Start: 2024-04-24 | End: 2024-04-24

## 2024-04-23 RX ADMIN — ENOXAPARIN SODIUM 40 MG: 40 INJECTION SUBCUTANEOUS at 16:42

## 2024-04-23 RX ADMIN — IPRATROPIUM BROMIDE AND ALBUTEROL SULFATE 3 ML: 2.5; .5 SOLUTION RESPIRATORY (INHALATION) at 19:30

## 2024-04-23 RX ADMIN — IPRATROPIUM BROMIDE AND ALBUTEROL SULFATE 3 ML: 2.5; .5 SOLUTION RESPIRATORY (INHALATION) at 13:50

## 2024-04-23 RX ADMIN — NABUMETONE 750 MG: 750 TABLET, FILM COATED ORAL at 20:29

## 2024-04-23 RX ADMIN — PIPERACILLIN SODIUM AND TAZOBACTAM SODIUM 4.5 G: 4; .5 INJECTION, SOLUTION INTRAVENOUS at 13:41

## 2024-04-23 RX ADMIN — PANTOPRAZOLE SODIUM 40 MG: 40 TABLET, DELAYED RELEASE ORAL at 05:34

## 2024-04-23 RX ADMIN — AZITHROMYCIN MONOHYDRATE 500 MG: 500 INJECTION, POWDER, LYOPHILIZED, FOR SOLUTION INTRAVENOUS at 09:30

## 2024-04-23 RX ADMIN — Medication 3 L/MIN: at 07:00

## 2024-04-23 RX ADMIN — METHYLPREDNISOLONE SODIUM SUCCINATE 40 MG: 40 INJECTION, POWDER, FOR SOLUTION INTRAMUSCULAR; INTRAVENOUS at 14:14

## 2024-04-23 RX ADMIN — PIPERACILLIN SODIUM AND TAZOBACTAM SODIUM 4.5 G: 4; .5 INJECTION, SOLUTION INTRAVENOUS at 01:12

## 2024-04-23 RX ADMIN — Medication 2 L/MIN: at 15:00

## 2024-04-23 RX ADMIN — Medication: at 23:00

## 2024-04-23 RX ADMIN — PIPERACILLIN SODIUM AND TAZOBACTAM SODIUM 4.5 G: 4; .5 INJECTION, SOLUTION INTRAVENOUS at 20:30

## 2024-04-23 RX ADMIN — IPRATROPIUM BROMIDE AND ALBUTEROL SULFATE 3 ML: 2.5; .5 SOLUTION RESPIRATORY (INHALATION) at 06:52

## 2024-04-23 RX ADMIN — NABUMETONE 750 MG: 750 TABLET, FILM COATED ORAL at 08:51

## 2024-04-23 RX ADMIN — HYDROXYCHLOROQUINE SULFATE 200 MG: 200 TABLET ORAL at 08:51

## 2024-04-23 RX ADMIN — PIPERACILLIN SODIUM AND TAZOBACTAM SODIUM 4.5 G: 4; .5 INJECTION, SOLUTION INTRAVENOUS at 08:51

## 2024-04-23 RX ADMIN — HYDROXYCHLOROQUINE SULFATE 200 MG: 200 TABLET ORAL at 20:29

## 2024-04-23 ASSESSMENT — COGNITIVE AND FUNCTIONAL STATUS - GENERAL
DAILY ACTIVITIY SCORE: 24
MOBILITY SCORE: 24
DAILY ACTIVITIY SCORE: 24
MOBILITY SCORE: 24

## 2024-04-23 ASSESSMENT — PAIN SCALES - GENERAL
PAINLEVEL_OUTOF10: 0 - NO PAIN
PAINLEVEL_OUTOF10: 0 - NO PAIN

## 2024-04-23 NOTE — CARE PLAN
The patient's goals for the shift include      The clinical goals for the shift include monitor O2, promote rest, and safety      Problem: Respiratory  Goal: Clear secretions with interventions this shift  Outcome: Progressing  Goal: No signs of respiratory distress (eg. Use of accessory muscles. Peds grunting)  Outcome: Progressing  Goal: Verbalize decreased shortness of breath this shift  Outcome: Progressing  Goal: Wean oxygen to maintain O2 saturation per order/standard this shift  Outcome: Progressing  Goal: Minimize anxiety/maximize coping throughout shift  Outcome: Progressing  Goal: Minimal/no exertional discomfort or dyspnea this shift  Outcome: Progressing  Goal: Patent airway maintained this shift  Outcome: Progressing  Goal: Tolerate mechanical ventilation evidenced by VS/agitation level this shift  Outcome: Progressing  Goal: Tolerate pulmonary toileting this shift  Outcome: Progressing  Goal: Increase self care and/or family involvement in next 24 hours  Outcome: Progressing

## 2024-04-23 NOTE — PROGRESS NOTES
04/23/24 1051   Discharge Planning   Patient expects to be discharged to: Home   Does the patient need discharge transport arranged? No     Home no needs

## 2024-04-23 NOTE — CARE PLAN
Problem: Respiratory  Goal: Clear secretions with interventions this shift  Outcome: Progressing  Goal: Verbalize decreased shortness of breath this shift  Outcome: Progressing  Goal: Wean oxygen to maintain O2 saturation per order/standard this shift  Outcome: Progressing  Goal: Minimal/no exertional discomfort or dyspnea this shift  Outcome: Progressing  Goal: Tolerate pulmonary toileting this shift  Outcome: Progressing

## 2024-04-23 NOTE — PROGRESS NOTES
"Dipesh Rousseau is a 69 y.o. male on day 3 of admission presenting with COPD exacerbation (Multi).    Subjective   Pt states is feeling better. Has been up ambulating in room without dyspnea. O2 weaned to 2 liters this am.          Objective     Last Recorded Vitals  Blood pressure 136/83, pulse 72, temperature 36.6 °C (97.9 °F), temperature source Temporal, resp. rate 17, height 1.702 m (5' 7\"), weight 97.5 kg (215 lb), SpO2 95%.      Intake/Output Summary (Last 24 hours) at 4/23/2024 1101  Last data filed at 4/23/2024 1027  Gross per 24 hour   Intake 2140 ml   Output --   Net 2140 ml         Physical Exam  HENT:      Head: Normocephalic and atraumatic.      Nose: Nose normal.      Mouth/Throat:      Mouth: Mucous membranes are moist.      Pharynx: Oropharynx is clear.   Eyes:      Extraocular Movements: Extraocular movements intact.      Pupils: Pupils are equal, round, and reactive to light.   Cardiovascular:      Rate and Rhythm: Normal rate and regular rhythm.      Pulses: Normal pulses.   Pulmonary:      Effort: Pulmonary effort is normal.      Breath sounds: Wheezing and rhonchi present.   Abdominal:      General: Abdomen is flat. Bowel sounds are normal.      Palpations: Abdomen is soft.   Musculoskeletal:         General: Normal range of motion.   Skin:     General: Skin is warm and dry.      Capillary Refill: Capillary refill takes less than 2 seconds.   Neurological:      General: No focal deficit present.      Mental Status: He is oriented to person, place, and time.   Psychiatric:         Mood and Affect: Mood normal.        Relevant Results    Lab Results   Component Value Date    WBC 15.2 (H) 04/21/2024    HGB 13.2 (L) 04/21/2024    HCT 39.1 (L) 04/21/2024    MCV 91 04/21/2024     (L) 04/21/2024       Lab Results   Component Value Date    GLUCOSE 165 (H) 04/21/2024    CALCIUM 8.5 04/21/2024     04/21/2024    K 3.8 04/21/2024    CO2 24 04/21/2024     04/21/2024    BUN 15 " "04/21/2024    CREATININE 0.90 04/21/2024       No results found for: \"HGBA1C\"    CT angio chest for pulmonary embolism    Result Date: 4/20/2024  Interpreted By:  Rigo Garrett, STUDY: CT ANGIO CHEST FOR PULMONARY EMBOLISM;  4/20/2024 4:33 pm   INDICATION: Signs/Symptoms:PE Protocol.   COMPARISON: None.   ACCESSION NUMBER(S): [id number]   ORDERING CLINICIAN: MAKAYLA PETERSEN   TECHNIQUE: Helical data acquisition of the chest was obtained after IV injection of  100 ml of  Omnipaque 350. Images were reformatted in axial, coronal, and sagittal planes. 3D reconstructed MIPS volumetric images were also generated at an independent workstation and reviewed.   FINDINGS: POTENTIAL LIMITATIONS OF THE STUDY: None   HEART AND VESSELS: No discrete filling defects within the main pulmonary artery or its branches.   The thoracic aorta is of normal course and caliber without aneurysm, dissection or significant atherosclerotic calcification.   No coronary artery calcifications are seen. The study is not optimized for evaluation of coronary arteries.   The cardiac chambers are not enlarged.   MEDIASTINUM AND MATEUS, LOWER NECK AND AXILLA: Enlargement and heterogeneity of the thyroid indicating goiter, with the largest nodule on the left measuring approximately 2.6 cm, and with several punctate calcifications. This also extends substernally.   There are several borderline mediastinal nodes most likely from reactive hypertrophy. No significant hilar adenopathy.   LUNGS AND AIRWAYS:   Mild bronchial wall thickening extends into the posterior basal segments with some parenchymal reticular and ground-glass attenuation, and tree-in-bud nodularity best seen in the left lower lung posteriorly.. This is probably from chronic bronchitis with bronchiolitis and peripheral mucous plugs. Bronchiectasis and wall thickening indicating bronchitis also extends into the middle lobe. There is a more discrete 1.1 cm nodular opacity at the right base " posteromedially on image 227 of series 11. This may be due to focal atelectasis, but a parenchymal nodule is possible. Follow-up is therefore recommended as: Incidental Finding:  A solid non-calcified pulmonary nodule measuring greater than 8 mm.  (**-YCF-**)   Instructions:  Consider short term follow up non-contrast Chest CT at 3 months, PET/CT or tissue sampling. Note, PET/CT may be limited in low grade malignancy, nodules <1 cm size or those located close to diaphragm. (Luiz Lewis et al., Guidelines for management of incidental pulmonary nodules detected on CT images: From the Fleischner Society 2017, Radiology. 2017 Jul;284 (1):228-243.) [email or web address]   No pleural effusion.   UPPER ABDOMEN AND OTHER FINDINGS: The visualized subdiaphragmatic structures demonstrate no remarkable findings.   CHEST WALL AND OSSEOUS STRUCTURES: There are no suspicious osseous lesions.       1.  No evidence of pulmonary embolus, aneurysm or dissection. 2.  Bronchitis probably with peripheral mucous plugs, bronchiolitis and possibly focal pneumonia of the lower lungs and middle lobe as described. 3. Possible right lower lung nodule as described with recommendation for follow-up. 4. Borderline probable reactive mediastinal nodes. 5. Goiter.   Signed by: Rigo Garrett 4/20/2024 5:23 PM Dictation workstation:   [id number]    XR chest 1 view    Result Date: 4/20/2024  Interpreted By:  Velasquez Shanks, STUDY: XR CHEST 1 VIEW;  4/20/2024 1:25 pm   INDICATION: Signs/Symptoms:SOB, hypoxia.   COMPARISON: 10/25/2021   ACCESSION NUMBER(S): [id number]   ORDERING CLINICIAN: ANDRES CEJA   FINDINGS: No focal infiltrate. No apparent pleural effusion. Cardiomediastinal silhouette unchanged. Mild pulmonary vascular congestion.       Mild pulmonary vascular congestion.   MACRO: None   Signed by: Velasquez Shanks 4/20/2024 1:29 PM Dictation workstation:   [id number]    CT kidney w and wo IV contrast    Result Date: 4/15/2024  * * *Final  Report* * * DATE OF EXAM: Apr 11 2024  9:05AM   [facility]   0546  -  CT KIDNEY WO/W IVCON  / ACCESSION #  [id number] PROCEDURE REASON: Acquired cyst of kidney      * * * * Physician Interpretation * * * *  EXAMINATION:  CT ABDOMEN (KIDNEY) WITHOUT AND WITH IV CONTRAST CLINICAL HISTORY: LEFT renal cyst noted on ultrasound. TECHNIQUE: Spiral imaging in three phases through the kidneys and including the abdomen was performed utilizing IV contrast only. No oral contrast was given. Arterial phase MIP, and nephrographic phase oblique coronal and sagittal reformations were created from thin-slice images under physician supervision on the imaging modality workstation. MQ:  CTKAWWO_1 Contrast: IV:  120 ml of Omnipaque 350 Oral Contrast: None CT Radiation dose: Integrated dose-length product (DLP) for this visit =   965 mGy*cm. CT Dose Reduction Employed: Automated exposure control (AEC) COMPARISON: Ultrasound 03/27/2024. RESULT: RIGHT kidney: No cystic or solid mass, calculus or hydronephrosis. LEFT kidney: 1.5 cm simple cyst (Bosniak one) mostly exophytic from the interpolar region corresponding to the lesion on ultrasound.  No other cystic or solid mass, calculus or hydronephrosis. Abdomen (other): Liver: No mass. Biliary: No bile duct dilation.  Gallbladder is unremarkable. Spleen: No mass. No splenomegaly. Pancreas: No mass or duct dilation. GI tract: No dilation or wall thickening. Small hiatal hernia. Lymph nodes (other): No abdominal lymphadenopathy. Mesentery/Peritoneum: No ascites or mass. Retroperitoneum: No mass. Vasculature:  - Abdominal aorta and iliac arteries: Dense aortic atherosclerotic calcifications.  No aneurysm.  - Celiac and SMA: Patent.  - Portal venous system: Patent:  - Hepatic veins: Patent. Bones/Soft Tissues: Small RIGHT diaphragmatic Bochdalek hernia containing only fat. Lower thorax: Areas of fibrosis or atelectasis in the RIGHT middle lobe and RIGHT lower lobe posteriorly. Localizer images: No  additional findings.    IMPRESSION: Benign-appearing LEFT renal cyst (Bosniak 1) corresponding to the ultrasound findings, no imaging follow-up is indicated. : [facility]   Transcribe Date/Time: Apr 15 2024  2:26P Dictated by : NATANAEL BRANDON MD This examination was interpreted and the report reviewed and electronically signed by: NATANAEL BRANDON MD on Apr 15 2024  2:36PM  EST    XR ABDOMEN 3V KUB W/OBLIQUES    Result Date: 4/2/2024  * * *Final Report* * * DATE OF EXAM: Mar 27 2024 11:21AM   [facility]   5358  -  XR ABDOMEN 3V KUB W/OBLIQUES  / ACCESSION #  [id number] PROCEDURE REASON: Pain of upper abdomen      * * * * Physician Interpretation * * * *  XR ABDOMEN 3V KUB W/OBLIQUES CLINICAL INFORMATION: ,Pain of upper abdomen 69 years Male COMPARISON: None available RESULT: See impression    IMPRESSION: Gas is noted in large and small bowel loops in a nonspecific pattern. Prominent stool noted within the colon.  No discrete calcific densities noted within the right and left flank.  Calcifications in the pelvis have the appearance of phleboliths.  Degenerative spine changes are noted. : [facility]   Transcribe Date/Time: Apr 2 2024  6:46P Dictated by : KRISTA JIMENEZ MD This examination was interpreted and the report reviewed and electronically signed by: KRISTA JIMENEZ MD on Apr 2 2024  6:47PM  EST    US gallbladder    Result Date: 3/29/2024  * * *Final Report* * * DATE OF EXAM: Mar 27 2024 11:14AM   [facility]   1032  -  US ABD RIGHT UPPER QUADRANT  / ACCESSION #  [id number] PROCEDURE REASON: Pain of upper abdomen      * * * * Physician Interpretation * * * *  EXAMINATION:   RIGHT UPPER QUADRANT AND SPLEEN ULTRASOUND CLINICAL HISTORY: Upper abdominal pain. TECHNIQUE:  Sonography of the right upper quadrant and spleen was performed.  Images were obtained and stored in a permanent archive. MQ:  URUQ_2 COMPARISON: None.   RESULT: Pancreas: Normal sonographic appearance.    Portions obscured: tail Liver:       Echotexture:  Normal, homogeneous.      Echogenicity:  Normal      Surface contour:  Smooth      Lesions:  None. Biliary: No intrahepatic biliary duct dilation.      CBD: 0.6 cm at the hilum.      Gallbladder: Normal caliber           -Contents:  No cholelithiasis           -Wall:  Normal           -Other:  No pericholecystic fluid. Kidneys: Limited analysis of the right kidney secondary to overlying bowel gas.  No hydronephrosis.  1.7 cm left lateral renal cyst. Ascites: None. Spleen: The craniocaudal length of the spleen is 10.6 cm, normal.  There are no splenic lesions.    IMPRESSION: Hepatic steatosis. 1.7 cm left renal cyst. Unremarkable spleen. : [facility]   Transcribe Date/Time: Mar 29 2024  7:16A Dictated by : SHARON SORTO MD This examination was interpreted and the report reviewed and electronically signed by: SHARON SORTO MD on Mar 29 2024  7:18AM  EST    US ABD SPLEEN - NB    Result Date: 3/29/2024  * * *Final Report* * * DATE OF EXAM: Mar 27 2024 11:14AM   [facility]   1232  -  US ABD SPLEEN -NB  / ACCESSION #  [id number] PROCEDURE REASON: Pain of upper abdomen      * * * * Physician Interpretation * * * *  EXAMINATION:   RIGHT UPPER QUADRANT AND SPLEEN ULTRASOUND CLINICAL HISTORY: Upper abdominal pain. TECHNIQUE:  Sonography of the right upper quadrant and spleen was performed.  Images were obtained and stored in a permanent archive. MQ:  URUQ_2 COMPARISON: None. RESULT: Pancreas: Normal sonographic appearance.    Portions obscured: tail Liver:      Echotexture:  Normal, homogeneous.      Echogenicity:  Normal      Surface contour:  Smooth      Lesions:  None. Biliary: No intrahepatic biliary duct dilation.       CBD: 0.6 cm at the hilum.      Gallbladder: Normal caliber           -Contents:  No cholelithiasis           -Wall:  Normal           -Other:  No pericholecystic fluid. Kidneys: Limited analysis of the right kidney secondary to overlying bowel gas.  No hydronephrosis.  1.7 cm left lateral  renal cyst. Ascites: None. Spleen: The craniocaudal length of the spleen is 10.6 cm, normal.  There are no splenic lesions.    IMPRESSION: Hepatic steatosis. 1.7 cm left renal cyst. Unremarkable spleen. : [facility]   Transcribe Date/Time: Mar 29 2024  7:16A Dictated by : SHARON SORTO MD This examination was interpreted and the report reviewed and electronically signed by: SHARON SORTO MD on Mar 29 2024  7:18AM  EST      Assessment/Plan   Principal Problem:  Acute COPD exacerbation   -Pulmonology follows  -IV steroids per Pulm  -IV Zosyn  -ICS/IBD's     Acute Respiratory Failure with hypoxia  -Currently on 2 liters, wean as able     SIRS (tachycardia, tachypnea, leukocytosis)  -Resolved     Tobacco Use  -Encourage cessation     GERD   -Continue PPI     RA  -Continue home meds     Dispo  To home on discharge with no needs. Anticipate discharge in the next 24-48 hrs.      Lou Carreon, SAAD-CNP

## 2024-04-24 LAB
ANION GAP SERPL CALC-SCNC: 8 MMOL/L
BUN SERPL-MCNC: 13 MG/DL (ref 8–25)
CALCIUM SERPL-MCNC: 8.4 MG/DL (ref 8.5–10.4)
CHLORIDE SERPL-SCNC: 104 MMOL/L (ref 97–107)
CO2 SERPL-SCNC: 27 MMOL/L (ref 24–31)
CREAT SERPL-MCNC: 0.8 MG/DL (ref 0.4–1.6)
EGFRCR SERPLBLD CKD-EPI 2021: >90 ML/MIN/1.73M*2
ERYTHROCYTE [DISTWIDTH] IN BLOOD BY AUTOMATED COUNT: 14.7 % (ref 11.5–14.5)
GLUCOSE SERPL-MCNC: 134 MG/DL (ref 65–99)
HCT VFR BLD AUTO: 38.6 % (ref 41–52)
HGB BLD-MCNC: 12.6 G/DL (ref 13.5–17.5)
MCH RBC QN AUTO: 29.9 PG (ref 26–34)
MCHC RBC AUTO-ENTMCNC: 32.6 G/DL (ref 32–36)
MCV RBC AUTO: 92 FL (ref 80–100)
NRBC BLD-RTO: 0 /100 WBCS (ref 0–0)
PLATELET # BLD AUTO: 131 X10*3/UL (ref 150–450)
POTASSIUM SERPL-SCNC: 3.8 MMOL/L (ref 3.4–5.1)
RBC # BLD AUTO: 4.21 X10*6/UL (ref 4.5–5.9)
SODIUM SERPL-SCNC: 139 MMOL/L (ref 133–145)
WBC # BLD AUTO: 9 X10*3/UL (ref 4.4–11.3)

## 2024-04-24 PROCEDURE — 94640 AIRWAY INHALATION TREATMENT: CPT

## 2024-04-24 PROCEDURE — 36415 COLL VENOUS BLD VENIPUNCTURE: CPT | Performed by: NURSE PRACTITIONER

## 2024-04-24 PROCEDURE — 80048 BASIC METABOLIC PNL TOTAL CA: CPT | Performed by: NURSE PRACTITIONER

## 2024-04-24 PROCEDURE — 2500000005 HC RX 250 GENERAL PHARMACY W/O HCPCS: Performed by: INTERNAL MEDICINE

## 2024-04-24 PROCEDURE — 2500000004 HC RX 250 GENERAL PHARMACY W/ HCPCS (ALT 636 FOR OP/ED): Performed by: NURSE PRACTITIONER

## 2024-04-24 PROCEDURE — 2500000002 HC RX 250 W HCPCS SELF ADMINISTERED DRUGS (ALT 637 FOR MEDICARE OP, ALT 636 FOR OP/ED): Mod: MUE | Performed by: INTERNAL MEDICINE

## 2024-04-24 PROCEDURE — 2500000001 HC RX 250 WO HCPCS SELF ADMINISTERED DRUGS (ALT 637 FOR MEDICARE OP): Performed by: NURSE PRACTITIONER

## 2024-04-24 PROCEDURE — 94668 MNPJ CHEST WALL SBSQ: CPT

## 2024-04-24 PROCEDURE — 9420000001 HC RT PATIENT EDUCATION 5 MIN

## 2024-04-24 PROCEDURE — 1100000001 HC PRIVATE ROOM DAILY

## 2024-04-24 PROCEDURE — 2500000002 HC RX 250 W HCPCS SELF ADMINISTERED DRUGS (ALT 637 FOR MEDICARE OP, ALT 636 FOR OP/ED): Performed by: INTERNAL MEDICINE

## 2024-04-24 PROCEDURE — 85027 COMPLETE CBC AUTOMATED: CPT | Performed by: NURSE PRACTITIONER

## 2024-04-24 RX ORDER — GUAIFENESIN 600 MG/1
600 TABLET, EXTENDED RELEASE ORAL 2 TIMES DAILY
Status: DISCONTINUED | OUTPATIENT
Start: 2024-04-24 | End: 2024-04-25 | Stop reason: HOSPADM

## 2024-04-24 RX ADMIN — HYDROXYCHLOROQUINE SULFATE 200 MG: 200 TABLET ORAL at 09:18

## 2024-04-24 RX ADMIN — ENOXAPARIN SODIUM 40 MG: 40 INJECTION SUBCUTANEOUS at 15:56

## 2024-04-24 RX ADMIN — Medication 2 L/MIN: at 07:00

## 2024-04-24 RX ADMIN — PIPERACILLIN SODIUM AND TAZOBACTAM SODIUM 4.5 G: 4; .5 INJECTION, SOLUTION INTRAVENOUS at 01:48

## 2024-04-24 RX ADMIN — IPRATROPIUM BROMIDE AND ALBUTEROL SULFATE 3 ML: 2.5; .5 SOLUTION RESPIRATORY (INHALATION) at 07:29

## 2024-04-24 RX ADMIN — IPRATROPIUM BROMIDE AND ALBUTEROL SULFATE 3 ML: 2.5; .5 SOLUTION RESPIRATORY (INHALATION) at 19:09

## 2024-04-24 RX ADMIN — METHYLPREDNISOLONE SODIUM SUCCINATE 40 MG: 40 INJECTION, POWDER, FOR SOLUTION INTRAMUSCULAR; INTRAVENOUS at 01:48

## 2024-04-24 RX ADMIN — Medication 2 L/MIN: at 23:00

## 2024-04-24 RX ADMIN — IPRATROPIUM BROMIDE AND ALBUTEROL SULFATE 3 ML: 2.5; .5 SOLUTION RESPIRATORY (INHALATION) at 13:16

## 2024-04-24 RX ADMIN — HYDROXYCHLOROQUINE SULFATE 200 MG: 200 TABLET ORAL at 20:47

## 2024-04-24 RX ADMIN — PANTOPRAZOLE SODIUM 40 MG: 40 TABLET, DELAYED RELEASE ORAL at 05:27

## 2024-04-24 RX ADMIN — NABUMETONE 750 MG: 750 TABLET, FILM COATED ORAL at 09:17

## 2024-04-24 RX ADMIN — GUAIFENESIN 600 MG: 600 TABLET ORAL at 13:12

## 2024-04-24 RX ADMIN — PIPERACILLIN SODIUM AND TAZOBACTAM SODIUM 4.5 G: 4; .5 INJECTION, SOLUTION INTRAVENOUS at 20:47

## 2024-04-24 RX ADMIN — GUAIFENESIN 600 MG: 600 TABLET ORAL at 20:47

## 2024-04-24 RX ADMIN — METHYLPREDNISOLONE SODIUM SUCCINATE 40 MG: 40 INJECTION, POWDER, FOR SOLUTION INTRAMUSCULAR; INTRAVENOUS at 16:00

## 2024-04-24 RX ADMIN — PIPERACILLIN SODIUM AND TAZOBACTAM SODIUM 4.5 G: 4; .5 INJECTION, SOLUTION INTRAVENOUS at 15:56

## 2024-04-24 RX ADMIN — Medication 2 L/MIN: at 15:00

## 2024-04-24 RX ADMIN — PREDNISONE 40 MG: 20 TABLET ORAL at 09:18

## 2024-04-24 RX ADMIN — PIPERACILLIN SODIUM AND TAZOBACTAM SODIUM 4.5 G: 4; .5 INJECTION, SOLUTION INTRAVENOUS at 09:17

## 2024-04-24 RX ADMIN — NABUMETONE 750 MG: 750 TABLET, FILM COATED ORAL at 20:47

## 2024-04-24 ASSESSMENT — COGNITIVE AND FUNCTIONAL STATUS - GENERAL
MOBILITY SCORE: 24
MOBILITY SCORE: 24
DAILY ACTIVITIY SCORE: 24
DAILY ACTIVITIY SCORE: 24

## 2024-04-24 ASSESSMENT — PAIN SCALES - GENERAL
PAINLEVEL_OUTOF10: 0 - NO PAIN
PAINLEVEL_OUTOF10: 0 - NO PAIN

## 2024-04-24 NOTE — PROGRESS NOTES
"Dipesh Rousseau is a 69 y.o. male on day 4 of admission seen in follow-up for acute hypoxia, acute COPD exacerbation, Rrght lower lobe lung nodule versus infiltrate    Subjective   On 3.5 L nasal cannula oxygen; oxygen sats 94%. Endorses improved breathing and a productive cough for thin, white sputum. Denies pain. Afebrile.       Objective   On 3 L nasal cannula oxygen; oxygen sats 94%. No respiratory complaints. Denies pain. Afebrile.  Physical Exam  Vitals and nursing note reviewed.   Constitutional:       Appearance: He is obese.   HENT:      Head: Normocephalic and atraumatic.      Nose: Nose normal.      Mouth/Throat:      Mouth: Mucous membranes are moist.   Eyes:      Extraocular Movements: Extraocular movements intact.      Conjunctiva/sclera: Conjunctivae normal.      Pupils: Pupils are equal, round, and reactive to light.   Cardiovascular:      Rate and Rhythm: Normal rate and regular rhythm.      Pulses: Normal pulses.      Heart sounds: Normal heart sounds.   Pulmonary:      Effort: Pulmonary effort is normal.      Comments: Lungs diminished but clear.  Abdominal:      General: Bowel sounds are normal.      Palpations: Abdomen is soft.   Musculoskeletal:         General: Normal range of motion.   Skin:     General: Skin is warm and dry.      Capillary Refill: Capillary refill takes less than 2 seconds.   Neurological:      General: No focal deficit present.      Mental Status: He is alert and oriented to person, place, and time.   Psychiatric:         Mood and Affect: Mood normal.         Behavior: Behavior normal.         Last Recorded Vitals  Blood pressure 158/82, pulse 65, temperature 36.4 °C (97.5 °F), temperature source Temporal, resp. rate 18, height 1.702 m (5' 7\"), weight 97.5 kg (215 lb), SpO2 94%.  Intake/Output last 3 Shifts:  I/O last 3 completed shifts:  In: 3410 (35 mL/kg) [P.O.:2460; IV Piggyback:950]  Out: - (0 mL/kg)   Weight: 97.5 kg     Intake/Output Summary (Last 24 hours) at " 4/24/2024 1057  Last data filed at 4/24/2024 0952  Gross per 24 hour   Intake 2420 ml   Output --   Net 2420 ml      enoxaparin, 40 mg, subcutaneous, q24h  hydroxychloroquine, 200 mg, oral, BID  ipratropium-albuteroL, 3 mL, nebulization, TID  nabumetone, 750 mg, oral, BID  oxygen, , inhalation, q8h  pantoprazole, 40 mg, oral, Daily before breakfast  piperacillin-tazobactam, 4.5 g, intravenous, q6h  predniSONE, 40 mg, oral, Daily       PRN medications: acetaminophen **OR** acetaminophen **OR** acetaminophen, alum-mag hydroxide-simeth, ipratropium-albuteroL, ondansetron ODT **OR** ondansetron, polyethylene glycol         Relevant Results  Results for orders placed or performed during the hospital encounter of 04/20/24 (from the past 24 hour(s))   Basic metabolic panel   Result Value Ref Range    Glucose 134 (H) 65 - 99 mg/dL    Sodium 139 133 - 145 mmol/L    Potassium 3.8 3.4 - 5.1 mmol/L    Chloride 104 97 - 107 mmol/L    Bicarbonate 27 24 - 31 mmol/L    Urea Nitrogen 13 8 - 25 mg/dL    Creatinine 0.80 0.40 - 1.60 mg/dL    eGFR >90 >60 mL/min/1.73m*2    Calcium 8.4 (L) 8.5 - 10.4 mg/dL    Anion Gap 8 <=19 mmol/L   CBC   Result Value Ref Range    WBC 9.0 4.4 - 11.3 x10*3/uL    nRBC 0.0 0.0 - 0.0 /100 WBCs    RBC 4.21 (L) 4.50 - 5.90 x10*6/uL    Hemoglobin 12.6 (L) 13.5 - 17.5 g/dL    Hematocrit 38.6 (L) 41.0 - 52.0 %    MCV 92 80 - 100 fL    MCH 29.9 26.0 - 34.0 pg    MCHC 32.6 32.0 - 36.0 g/dL    RDW 14.7 (H) 11.5 - 14.5 %    Platelets 131 (L) 150 - 450 x10*3/uL      CT angio chest for pulmonary embolism  Result Date: 4/20/2024  FINDINGS: POTENTIAL LIMITATIONS OF THE STUDY: None   HEART AND VESSELS: No discrete filling defects within the main pulmonary artery or its branches.   The thoracic aorta is of normal course and caliber without aneurysm, dissection or significant atherosclerotic calcification.   No coronary artery calcifications are seen. The study is not optimized for evaluation of coronary arteries.   The  cardiac chambers are not enlarged.   MEDIASTINUM AND MATEUS, LOWER NECK AND AXILLA: Enlargement and heterogeneity of the thyroid indicating goiter, with the largest nodule on the left measuring approximately 2.6 cm, and with several punctate calcifications. This also extends substernally.   There are several borderline mediastinal nodes most likely from reactive hypertrophy. No significant hilar adenopathy.   LUNGS AND AIRWAYS:   Mild bronchial wall thickening extends into the posterior basal segments with some parenchymal reticular and ground-glass attenuation, and tree-in-bud nodularity best seen in the left lower lung posteriorly.. This is probably from chronic bronchitis with bronchiolitis and peripheral mucous plugs. Bronchiectasis and wall thickening indicating bronchitis also extends into the middle lobe. There is a more discrete 1.1 cm nodular opacity at the right base posteromedially on image 227 of series 11. This may be due to focal atelectasis, but a parenchymal nodule is possible. Follow-up is therefore recommended as: Incidental Finding:  A solid non-calcified pulmonary nodule measuring greater than 8 mm.  (**-YCF-**)   Instructions:  Consider short term follow up non-contrast Chest CT at 3 months, PET/CT or tissue sampling. Note, PET/CT may be limited in low grade malignancy, nodules <1 cm size or those located close to diaphragm. (Almondmarion Reynosohojuan alberto et al., Guidelines for management of incidental pulmonary nodules detected on CT images: From the Fleischner Society 2017, Radiology. 2017 Jul;284 (1):228-243.) [email or web address]   No pleural effusion.   UPPER ABDOMEN AND OTHER FINDINGS: The visualized subdiaphragmatic structures demonstrate no remarkable findings.   CHEST WALL AND OSSEOUS STRUCTURES: There are no suspicious osseous lesions.  1.  No evidence of pulmonary embolus, aneurysm or dissection. 2.  Bronchitis probably with peripheral mucous plugs, bronchiolitis and possibly focal pneumonia of the  lower lungs and middle lobe as described. 3. Possible right lower lung nodule as described with recommendation for follow-up. 4. Borderline probable reactive mediastinal nodes. 5. Goiter.       XR chest 1 view  Result Date: 4/20/2024  FINDINGS: No focal infiltrate. No apparent pleural effusion. Cardiomediastinal silhouette unchanged. Mild pulmonary vascular congestion.  Mild pulmonary vascular congestion.        Impression  Acute hypoxia  Acute COPD exacerbation  Right lower lobe lung nodule versus infiltrate  Tobacco use  Obesity    Plan  Wean oxygen as sats allow  Continue IBD/ICS  Insentive spirometry/pulmonary hygiene  Prednisone-discharge on taper  Continue Zosyn  FEV1  Polysomnogram as outpatient  Smoking cessation  Prophylaxis  PT/OT/out of bed    Jennifer Mc, APRN-CNP  [facility]

## 2024-04-24 NOTE — PROGRESS NOTES
04/24/24 0902   Discharge Planning   Patient expects to be discharged to: Home   Does the patient need discharge transport arranged? No     Home no needs.  Down to 2L NC.  Weening to RA.

## 2024-04-24 NOTE — PROGRESS NOTES
"Dipesh Rousseau is a 69 y.o. male on day 4 of admission presenting with COPD exacerbation (Multi).    Subjective   Slowly improving. Still with some wheezing. Remains on O2 2 liters and nursing has so far been unable to wean this. Wife at bedside.          Objective     Last Recorded Vitals  Blood pressure 158/82, pulse 65, temperature 36.4 °C (97.5 °F), temperature source Temporal, resp. rate 18, height 1.702 m (5' 7\"), weight 97.5 kg (215 lb), SpO2 94%.      Intake/Output Summary (Last 24 hours) at 4/24/2024 1229  Last data filed at 4/24/2024 0952  Gross per 24 hour   Intake 2420 ml   Output --   Net 2420 ml         Physical Exam  HENT:      Head: Normocephalic and atraumatic.      Nose: Nose normal.      Mouth/Throat:      Mouth: Mucous membranes are moist.      Pharynx: Oropharynx is clear.   Eyes:      Extraocular Movements: Extraocular movements intact.      Pupils: Pupils are equal, round, and reactive to light.   Cardiovascular:      Rate and Rhythm: Normal rate and regular rhythm.      Pulses: Normal pulses.   Pulmonary:      Effort: Pulmonary effort is normal.      Breath sounds: Wheezing and rhonchi present.   Abdominal:      General: Abdomen is flat. Bowel sounds are normal.      Palpations: Abdomen is soft.   Musculoskeletal:         General: Normal range of motion.   Skin:     General: Skin is warm and dry.      Capillary Refill: Capillary refill takes less than 2 seconds.   Neurological:      General: No focal deficit present.      Mental Status: He is oriented to person, place, and time.   Psychiatric:         Mood and Affect: Mood normal.        Relevant Results    Lab Results   Component Value Date    WBC 9.0 04/24/2024    HGB 12.6 (L) 04/24/2024    HCT 38.6 (L) 04/24/2024    MCV 92 04/24/2024     (L) 04/24/2024       Lab Results   Component Value Date    GLUCOSE 134 (H) 04/24/2024    CALCIUM 8.4 (L) 04/24/2024     04/24/2024    K 3.8 04/24/2024    CO2 27 04/24/2024     " "04/24/2024    BUN 13 04/24/2024    CREATININE 0.80 04/24/2024       No results found for: \"HGBA1C\"    CT angio chest for pulmonary embolism    Result Date: 4/20/2024  Interpreted By:  Rigo Garrett, STUDY: CT ANGIO CHEST FOR PULMONARY EMBOLISM;  4/20/2024 4:33 pm   INDICATION: Signs/Symptoms:PE Protocol.   COMPARISON: None.   ACCESSION NUMBER(S): [id number]   ORDERING CLINICIAN: MAKAYLA PETERSEN   TECHNIQUE: Helical data acquisition of the chest was obtained after IV injection of  100 ml of  Omnipaque 350. Images were reformatted in axial, coronal, and sagittal planes. 3D reconstructed MIPS volumetric images were also generated at an independent workstation and reviewed.   FINDINGS: POTENTIAL LIMITATIONS OF THE STUDY: None   HEART AND VESSELS: No discrete filling defects within the main pulmonary artery or its branches.   The thoracic aorta is of normal course and caliber without aneurysm, dissection or significant atherosclerotic calcification.   No coronary artery calcifications are seen. The study is not optimized for evaluation of coronary arteries.   The cardiac chambers are not enlarged.   MEDIASTINUM AND MATEUS, LOWER NECK AND AXILLA: Enlargement and heterogeneity of the thyroid indicating goiter, with the largest nodule on the left measuring approximately 2.6 cm, and with several punctate calcifications. This also extends substernally.   There are several borderline mediastinal nodes most likely from reactive hypertrophy. No significant hilar adenopathy.   LUNGS AND AIRWAYS:   Mild bronchial wall thickening extends into the posterior basal segments with some parenchymal reticular and ground-glass attenuation, and tree-in-bud nodularity best seen in the left lower lung posteriorly.. This is probably from chronic bronchitis with bronchiolitis and peripheral mucous plugs. Bronchiectasis and wall thickening indicating bronchitis also extends into the middle lobe. There is a more discrete 1.1 cm nodular opacity at the " right base posteromedially on image 227 of series 11. This may be due to focal atelectasis, but a parenchymal nodule is possible. Follow-up is therefore recommended as: Incidental Finding:  A solid non-calcified pulmonary nodule measuring greater than 8 mm.  (**-YCF-**)   Instructions:  Consider short term follow up non-contrast Chest CT at 3 months, PET/CT or tissue sampling. Note, PET/CT may be limited in low grade malignancy, nodules <1 cm size or those located close to diaphragm. (Luiz Lewis et al., Guidelines for management of incidental pulmonary nodules detected on CT images: From the Fleischner Society 2017, Radiology. 2017 Jul;284 (1):228-243.) [email or web address]   No pleural effusion.   UPPER ABDOMEN AND OTHER FINDINGS: The visualized subdiaphragmatic structures demonstrate no remarkable findings.   CHEST WALL AND OSSEOUS STRUCTURES: There are no suspicious osseous lesions.       1.  No evidence of pulmonary embolus, aneurysm or dissection. 2.  Bronchitis probably with peripheral mucous plugs, bronchiolitis and possibly focal pneumonia of the lower lungs and middle lobe as described. 3. Possible right lower lung nodule as described with recommendation for follow-up. 4. Borderline probable reactive mediastinal nodes. 5. Goiter.   Signed by: Rigo Garrett 4/20/2024 5:23 PM Dictation workstation:   [id number]    XR chest 1 view    Result Date: 4/20/2024  Interpreted By:  Velasquez Shanks, STUDY: XR CHEST 1 VIEW;  4/20/2024 1:25 pm   INDICATION: Signs/Symptoms:SOB, hypoxia.   COMPARISON: 10/25/2021   ACCESSION NUMBER(S): [id number]   ORDERING CLINICIAN: ANDRES CEJA   FINDINGS: No focal infiltrate. No apparent pleural effusion. Cardiomediastinal silhouette unchanged. Mild pulmonary vascular congestion.       Mild pulmonary vascular congestion.   MACRO: None   Signed by: Velasquez Shanks 4/20/2024 1:29 PM Dictation workstation:   [id number]    CT kidney w and wo IV contrast    Result Date: 4/15/2024  * *  *Final Report* * * DATE OF EXAM: Apr 11 2024  9:05AM   [facility]   0546  -  CT KIDNEY WO/W IVCON  / ACCESSION #  [id number] PROCEDURE REASON: Acquired cyst of kidney      * * * * Physician Interpretation * * * *  EXAMINATION:  CT ABDOMEN (KIDNEY) WITHOUT AND WITH IV CONTRAST CLINICAL HISTORY: LEFT renal cyst noted on ultrasound. TECHNIQUE: Spiral imaging in three phases through the kidneys and including the abdomen was performed utilizing IV contrast only. No oral contrast was given. Arterial phase MIP, and nephrographic phase oblique coronal and sagittal reformations were created from thin-slice images under physician supervision on the imaging modality workstation. MQ:  CTKAWWO_1 Contrast: IV:  120 ml of Omnipaque 350 Oral Contrast: None CT Radiation dose: Integrated dose-length product (DLP) for this visit =   965 mGy*cm. CT Dose Reduction Employed: Automated exposure control (AEC) COMPARISON: Ultrasound 03/27/2024. RESULT: RIGHT kidney: No cystic or solid mass, calculus or hydronephrosis. LEFT kidney: 1.5 cm simple cyst (Bosniak one) mostly exophytic from the interpolar region corresponding to the lesion on ultrasound.  No other cystic or solid mass, calculus or hydronephrosis. Abdomen (other): Liver: No mass. Biliary: No bile duct dilation.  Gallbladder is unremarkable. Spleen: No mass. No splenomegaly. Pancreas: No mass or duct dilation. GI tract: No dilation or wall thickening. Small hiatal hernia. Lymph nodes (other): No abdominal lymphadenopathy. Mesentery/Peritoneum: No ascites or mass. Retroperitoneum: No mass. Vasculature:  - Abdominal aorta and iliac arteries: Dense aortic atherosclerotic calcifications.  No aneurysm.  - Celiac and SMA: Patent.  - Portal venous system: Patent:  - Hepatic veins: Patent. Bones/Soft Tissues: Small RIGHT diaphragmatic Bochdalek hernia containing only fat. Lower thorax: Areas of fibrosis or atelectasis in the RIGHT middle lobe and RIGHT lower lobe posteriorly. Localizer images: No  additional findings.    IMPRESSION: Benign-appearing LEFT renal cyst (Bosniak 1) corresponding to the ultrasound findings, no imaging follow-up is indicated. : [facility]   Transcribe Date/Time: Apr 15 2024  2:26P Dictated by : NATANAEL BRANDON MD This examination was interpreted and the report reviewed and electronically signed by: NATANAEL BRANDON MD on Apr 15 2024  2:36PM  EST    XR ABDOMEN 3V KUB W/OBLIQUES    Result Date: 4/2/2024  * * *Final Report* * * DATE OF EXAM: Mar 27 2024 11:21AM   [facility]   5358  -  XR ABDOMEN 3V KUB W/OBLIQUES  / ACCESSION #  [id number] PROCEDURE REASON: Pain of upper abdomen      * * * * Physician Interpretation * * * *  XR ABDOMEN 3V KUB W/OBLIQUES CLINICAL INFORMATION: ,Pain of upper abdomen 69 years Male COMPARISON: None available RESULT: See impression    IMPRESSION: Gas is noted in large and small bowel loops in a nonspecific pattern. Prominent stool noted within the colon.  No discrete calcific densities noted within the right and left flank.  Calcifications in the pelvis have the appearance of phleboliths.  Degenerative spine changes are noted. : [facility]   Transcribe Date/Time: Apr 2 2024  6:46P Dictated by : KRISTA JIMENEZ MD This examination was interpreted and the report reviewed and electronically signed by: KRISTA JIMENEZ MD on Apr 2 2024  6:47PM  EST    US gallbladder    Result Date: 3/29/2024  * * *Final Report* * * DATE OF EXAM: Mar 27 2024 11:14AM   [facility]   1032  -  US ABD RIGHT UPPER QUADRANT  / ACCESSION #  [id number] PROCEDURE REASON: Pain of upper abdomen      * * * * Physician Interpretation * * * *  EXAMINATION:   RIGHT UPPER QUADRANT AND SPLEEN ULTRASOUND CLINICAL HISTORY: Upper abdominal pain. TECHNIQUE:  Sonography of the right upper quadrant and spleen was performed.  Images were obtained and stored in a permanent archive. MQ:  URUQ_2 COMPARISON: None.   RESULT: Pancreas: Normal sonographic appearance.    Portions obscured: tail Liver:       Echotexture:  Normal, homogeneous.      Echogenicity:  Normal      Surface contour:  Smooth      Lesions:  None. Biliary: No intrahepatic biliary duct dilation.      CBD: 0.6 cm at the hilum.      Gallbladder: Normal caliber           -Contents:  No cholelithiasis           -Wall:  Normal           -Other:  No pericholecystic fluid. Kidneys: Limited analysis of the right kidney secondary to overlying bowel gas.  No hydronephrosis.  1.7 cm left lateral renal cyst. Ascites: None. Spleen: The craniocaudal length of the spleen is 10.6 cm, normal.  There are no splenic lesions.    IMPRESSION: Hepatic steatosis. 1.7 cm left renal cyst. Unremarkable spleen. : [facility]   Transcribe Date/Time: Mar 29 2024  7:16A Dictated by : SHARON SORTO MD This examination was interpreted and the report reviewed and electronically signed by: SHARON SORTO MD on Mar 29 2024  7:18AM  EST    US ABD SPLEEN - NB    Result Date: 3/29/2024  * * *Final Report* * * DATE OF EXAM: Mar 27 2024 11:14AM   [facility]   1232  -  US ABD SPLEEN -NB  / ACCESSION #  [id number] PROCEDURE REASON: Pain of upper abdomen      * * * * Physician Interpretation * * * *  EXAMINATION:   RIGHT UPPER QUADRANT AND SPLEEN ULTRASOUND CLINICAL HISTORY: Upper abdominal pain. TECHNIQUE:  Sonography of the right upper quadrant and spleen was performed.  Images were obtained and stored in a permanent archive. MQ:  URUQ_2 COMPARISON: None. RESULT: Pancreas: Normal sonographic appearance.    Portions obscured: tail Liver:      Echotexture:  Normal, homogeneous.      Echogenicity:  Normal      Surface contour:  Smooth      Lesions:  None. Biliary: No intrahepatic biliary duct dilation.       CBD: 0.6 cm at the hilum.      Gallbladder: Normal caliber           -Contents:  No cholelithiasis           -Wall:  Normal           -Other:  No pericholecystic fluid. Kidneys: Limited analysis of the right kidney secondary to overlying bowel gas.  No hydronephrosis.  1.7 cm left lateral  renal cyst. Ascites: None. Spleen: The craniocaudal length of the spleen is 10.6 cm, normal.  There are no splenic lesions.    IMPRESSION: Hepatic steatosis. 1.7 cm left renal cyst. Unremarkable spleen. : [facility]   Transcribe Date/Time: Mar 29 2024  7:16A Dictated by : SHARON SORTO MD This examination was interpreted and the report reviewed and electronically signed by: SHARON SORTO MD on Mar 29 2024  7:18AM  EST      Assessment/Plan   Principal Problem:  Acute COPD exacerbation   -Pulmonology follows  -Continue IV steroids. Will dc on Prednisone taper  -Continue IV Zosyn  -ICS/IBD's     Acute Respiratory Failure with hypoxia  -Currently on 2 liters, wean as able     SIRS (tachycardia, tachypnea, leukocytosis)  -Resolved     Tobacco Use  -Encourage cessation     GERD   -Continue PPI     RA  -Continue home meds     Dispo  To home on discharge with no needs. Anticipate discharge in the next 24-48 hrs.      SAAD Tay-CNP

## 2024-04-24 NOTE — CARE PLAN
The patient's goals for the shift include      The clinical goals for the shift include monitor O2, IV antibiotics, safety, and promote rest.      Problem: Pain - Adult  Goal: Verbalizes/displays adequate comfort level or baseline comfort level  Outcome: Progressing     Problem: Safety - Adult  Goal: Free from fall injury  Outcome: Progressing

## 2024-04-25 ENCOUNTER — PHARMACY VISIT (OUTPATIENT)
Dept: PHARMACY | Facility: CLINIC | Age: 69
End: 2024-04-25
Payer: COMMERCIAL

## 2024-04-25 VITALS
BODY MASS INDEX: 33.74 KG/M2 | WEIGHT: 215 LBS | DIASTOLIC BLOOD PRESSURE: 94 MMHG | OXYGEN SATURATION: 96 % | HEART RATE: 70 BPM | SYSTOLIC BLOOD PRESSURE: 165 MMHG | HEIGHT: 67 IN | TEMPERATURE: 98.2 F | RESPIRATION RATE: 14 BRPM

## 2024-04-25 LAB
ANION GAP SERPL CALC-SCNC: 11 MMOL/L
BUN SERPL-MCNC: 18 MG/DL (ref 8–25)
CALCIUM SERPL-MCNC: 8.7 MG/DL (ref 8.5–10.4)
CHLORIDE SERPL-SCNC: 106 MMOL/L (ref 97–107)
CO2 SERPL-SCNC: 25 MMOL/L (ref 24–31)
CREAT SERPL-MCNC: 0.9 MG/DL (ref 0.4–1.6)
EGFRCR SERPLBLD CKD-EPI 2021: >90 ML/MIN/1.73M*2
ERYTHROCYTE [DISTWIDTH] IN BLOOD BY AUTOMATED COUNT: 14.6 % (ref 11.5–14.5)
GLUCOSE SERPL-MCNC: 126 MG/DL (ref 65–99)
HCT VFR BLD AUTO: 38.2 % (ref 41–52)
HGB BLD-MCNC: 12.5 G/DL (ref 13.5–17.5)
MCH RBC QN AUTO: 30.1 PG (ref 26–34)
MCHC RBC AUTO-ENTMCNC: 32.7 G/DL (ref 32–36)
MCV RBC AUTO: 92 FL (ref 80–100)
NRBC BLD-RTO: 0 /100 WBCS (ref 0–0)
PLATELET # BLD AUTO: 141 X10*3/UL (ref 150–450)
POTASSIUM SERPL-SCNC: 4.2 MMOL/L (ref 3.4–5.1)
RBC # BLD AUTO: 4.15 X10*6/UL (ref 4.5–5.9)
SODIUM SERPL-SCNC: 142 MMOL/L (ref 133–145)
WBC # BLD AUTO: 14.1 X10*3/UL (ref 4.4–11.3)

## 2024-04-25 PROCEDURE — 85027 COMPLETE CBC AUTOMATED: CPT | Performed by: NURSE PRACTITIONER

## 2024-04-25 PROCEDURE — 2500000002 HC RX 250 W HCPCS SELF ADMINISTERED DRUGS (ALT 637 FOR MEDICARE OP, ALT 636 FOR OP/ED): Performed by: INTERNAL MEDICINE

## 2024-04-25 PROCEDURE — 2500000004 HC RX 250 GENERAL PHARMACY W/ HCPCS (ALT 636 FOR OP/ED): Performed by: NURSE PRACTITIONER

## 2024-04-25 PROCEDURE — 2500000005 HC RX 250 GENERAL PHARMACY W/O HCPCS: Performed by: INTERNAL MEDICINE

## 2024-04-25 PROCEDURE — 94668 MNPJ CHEST WALL SBSQ: CPT

## 2024-04-25 PROCEDURE — 9420000001 HC RT PATIENT EDUCATION 5 MIN

## 2024-04-25 PROCEDURE — 82374 ASSAY BLOOD CARBON DIOXIDE: CPT | Performed by: NURSE PRACTITIONER

## 2024-04-25 PROCEDURE — RXMED WILLOW AMBULATORY MEDICATION CHARGE

## 2024-04-25 PROCEDURE — 2500000001 HC RX 250 WO HCPCS SELF ADMINISTERED DRUGS (ALT 637 FOR MEDICARE OP): Performed by: NURSE PRACTITIONER

## 2024-04-25 PROCEDURE — 36415 COLL VENOUS BLD VENIPUNCTURE: CPT | Performed by: NURSE PRACTITIONER

## 2024-04-25 PROCEDURE — 2500000002 HC RX 250 W HCPCS SELF ADMINISTERED DRUGS (ALT 637 FOR MEDICARE OP, ALT 636 FOR OP/ED): Mod: MUE | Performed by: INTERNAL MEDICINE

## 2024-04-25 PROCEDURE — 94640 AIRWAY INHALATION TREATMENT: CPT

## 2024-04-25 RX ORDER — GUAIFENESIN 600 MG/1
600 TABLET, EXTENDED RELEASE ORAL 2 TIMES DAILY
Qty: 14 TABLET | Refills: 0 | Status: SHIPPED | OUTPATIENT
Start: 2024-04-25

## 2024-04-25 RX ORDER — ALBUTEROL SULFATE 90 UG/1
2 AEROSOL, METERED RESPIRATORY (INHALATION) EVERY 4 HOURS PRN
Qty: 8.5 G | Refills: 3 | Status: SHIPPED | OUTPATIENT
Start: 2024-04-25

## 2024-04-25 RX ORDER — PREDNISONE 10 MG/1
TABLET ORAL
Qty: 30 TABLET | Refills: 0 | Status: SHIPPED | OUTPATIENT
Start: 2024-04-25 | End: 2024-05-08 | Stop reason: ALTCHOICE

## 2024-04-25 RX ORDER — ACETAMINOPHEN 325 MG/1
650 TABLET ORAL EVERY 4 HOURS PRN
Start: 2024-04-25

## 2024-04-25 RX ADMIN — PANTOPRAZOLE SODIUM 40 MG: 40 TABLET, DELAYED RELEASE ORAL at 05:02

## 2024-04-25 RX ADMIN — HYDROXYCHLOROQUINE SULFATE 200 MG: 200 TABLET ORAL at 08:30

## 2024-04-25 RX ADMIN — GUAIFENESIN 600 MG: 600 TABLET ORAL at 08:30

## 2024-04-25 RX ADMIN — PIPERACILLIN SODIUM AND TAZOBACTAM SODIUM 4.5 G: 4; .5 INJECTION, SOLUTION INTRAVENOUS at 02:08

## 2024-04-25 RX ADMIN — NABUMETONE 750 MG: 750 TABLET, FILM COATED ORAL at 08:30

## 2024-04-25 RX ADMIN — PIPERACILLIN SODIUM AND TAZOBACTAM SODIUM 4.5 G: 4; .5 INJECTION, SOLUTION INTRAVENOUS at 08:35

## 2024-04-25 RX ADMIN — METHYLPREDNISOLONE SODIUM SUCCINATE 40 MG: 40 INJECTION, POWDER, FOR SOLUTION INTRAMUSCULAR; INTRAVENOUS at 05:02

## 2024-04-25 RX ADMIN — IPRATROPIUM BROMIDE AND ALBUTEROL SULFATE 3 ML: 2.5; .5 SOLUTION RESPIRATORY (INHALATION) at 07:47

## 2024-04-25 RX ADMIN — Medication 1 L/MIN: at 06:00

## 2024-04-25 ASSESSMENT — PAIN - FUNCTIONAL ASSESSMENT: PAIN_FUNCTIONAL_ASSESSMENT: 0-10

## 2024-04-25 ASSESSMENT — PAIN SCALES - GENERAL: PAINLEVEL_OUTOF10: 0 - NO PAIN

## 2024-04-25 NOTE — PROGRESS NOTES
"Dipesh Rousseau is a 69 y.o. male on day 5 of admission seen in follow-up for acute hypoxia, acute COPD exacerbation, Rrght lower lobe lung nodule versus infiltrate    Subjective   On room air; oxygen sats 94%. No respiratory complaints. Denies pain. Afebrile.       Objective   On 3 L nasal cannula oxygen; oxygen sats 94%. No respiratory complaints. Denies pain. Afebrile.  Physical Exam  Vitals and nursing note reviewed.   Constitutional:       Appearance: Normal appearance. He is obese.   HENT:      Head: Normocephalic and atraumatic.      Nose: Nose normal.      Mouth/Throat:      Mouth: Mucous membranes are moist.   Eyes:      Extraocular Movements: Extraocular movements intact.      Conjunctiva/sclera: Conjunctivae normal.      Pupils: Pupils are equal, round, and reactive to light.   Cardiovascular:      Rate and Rhythm: Normal rate and regular rhythm.      Pulses: Normal pulses.      Heart sounds: Normal heart sounds.   Pulmonary:      Effort: Pulmonary effort is normal.      Breath sounds: Normal breath sounds.      Comments: Lungs diminished but clear.  Abdominal:      General: Bowel sounds are normal.      Palpations: Abdomen is soft.   Musculoskeletal:         General: Normal range of motion.   Skin:     General: Skin is warm and dry.      Capillary Refill: Capillary refill takes less than 2 seconds.   Neurological:      General: No focal deficit present.      Mental Status: He is alert and oriented to person, place, and time.   Psychiatric:         Mood and Affect: Mood normal.         Behavior: Behavior normal.         Last Recorded Vitals  Blood pressure (!) 165/94, pulse 70, temperature 36.8 °C (98.2 °F), temperature source Temporal, resp. rate 14, height 1.702 m (5' 7\"), weight 97.5 kg (215 lb), SpO2 96%.  Intake/Output last 3 Shifts:  I/O last 3 completed shifts:  In: 2830 (29 mL/kg) [P.O.:2130; IV Piggyback:700]  Out: 0 (0 mL/kg)   Weight: 97.5 kg     Intake/Output Summary (Last 24 hours) at " 4/25/2024 1240  Last data filed at 4/25/2024 0954  Gross per 24 hour   Intake 1910 ml   Output 0 ml   Net 1910 ml      enoxaparin, 40 mg, subcutaneous, q24h  guaiFENesin, 600 mg, oral, BID  hydroxychloroquine, 200 mg, oral, BID  ipratropium-albuteroL, 3 mL, nebulization, TID  methylPREDNISolone sodium succinate (PF), 40 mg, intravenous, q12h  nabumetone, 750 mg, oral, BID  oxygen, , inhalation, q8h  pantoprazole, 40 mg, oral, Daily before breakfast  piperacillin-tazobactam, 4.5 g, intravenous, q6h       PRN medications: acetaminophen **OR** acetaminophen **OR** acetaminophen, alum-mag hydroxide-simeth, ipratropium-albuteroL, ondansetron ODT **OR** ondansetron, polyethylene glycol         Relevant Results  Results for orders placed or performed during the hospital encounter of 04/20/24 (from the past 24 hour(s))   Basic metabolic panel   Result Value Ref Range    Glucose 126 (H) 65 - 99 mg/dL    Sodium 142 133 - 145 mmol/L    Potassium 4.2 3.4 - 5.1 mmol/L    Chloride 106 97 - 107 mmol/L    Bicarbonate 25 24 - 31 mmol/L    Urea Nitrogen 18 8 - 25 mg/dL    Creatinine 0.90 0.40 - 1.60 mg/dL    eGFR >90 >60 mL/min/1.73m*2    Calcium 8.7 8.5 - 10.4 mg/dL    Anion Gap 11 <=19 mmol/L   CBC   Result Value Ref Range    WBC 14.1 (H) 4.4 - 11.3 x10*3/uL    nRBC 0.0 0.0 - 0.0 /100 WBCs    RBC 4.15 (L) 4.50 - 5.90 x10*6/uL    Hemoglobin 12.5 (L) 13.5 - 17.5 g/dL    Hematocrit 38.2 (L) 41.0 - 52.0 %    MCV 92 80 - 100 fL    MCH 30.1 26.0 - 34.0 pg    MCHC 32.7 32.0 - 36.0 g/dL    RDW 14.6 (H) 11.5 - 14.5 %    Platelets 141 (L) 150 - 450 x10*3/uL      CT angio chest for pulmonary embolism  Result Date: 4/20/2024  FINDINGS: POTENTIAL LIMITATIONS OF THE STUDY: None   HEART AND VESSELS: No discrete filling defects within the main pulmonary artery or its branches.   The thoracic aorta is of normal course and caliber without aneurysm, dissection or significant atherosclerotic calcification.   No coronary artery calcifications are seen.  The study is not optimized for evaluation of coronary arteries.   The cardiac chambers are not enlarged.   MEDIASTINUM AND MATEUS, LOWER NECK AND AXILLA: Enlargement and heterogeneity of the thyroid indicating goiter, with the largest nodule on the left measuring approximately 2.6 cm, and with several punctate calcifications. This also extends substernally.   There are several borderline mediastinal nodes most likely from reactive hypertrophy. No significant hilar adenopathy.   LUNGS AND AIRWAYS:   Mild bronchial wall thickening extends into the posterior basal segments with some parenchymal reticular and ground-glass attenuation, and tree-in-bud nodularity best seen in the left lower lung posteriorly.. This is probably from chronic bronchitis with bronchiolitis and peripheral mucous plugs. Bronchiectasis and wall thickening indicating bronchitis also extends into the middle lobe. There is a more discrete 1.1 cm nodular opacity at the right base posteromedially on image 227 of series 11. This may be due to focal atelectasis, but a parenchymal nodule is possible. Follow-up is therefore recommended as: Incidental Finding:  A solid non-calcified pulmonary nodule measuring greater than 8 mm.  (**-YCF-**)   Instructions:  Consider short term follow up non-contrast Chest CT at 3 months, PET/CT or tissue sampling. Note, PET/CT may be limited in low grade malignancy, nodules <1 cm size or those located close to diaphragm. (Luiz Lewis et al., Guidelines for management of incidental pulmonary nodules detected on CT images: From the Fleischner Society 2017, Radiology. 2017 Jul;284 (1):228-243.) [email or web address].[email or web address].4   No pleural effusion.   UPPER ABDOMEN AND OTHER FINDINGS: The visualized subdiaphragmatic structures demonstrate no remarkable findings.   CHEST WALL AND OSSEOUS STRUCTURES: There are no suspicious osseous lesions.  1.  No evidence of pulmonary embolus, aneurysm or dissection. 2.  Bronchitis probably with  peripheral mucous plugs, bronchiolitis and possibly focal pneumonia of the lower lungs and middle lobe as described. 3. Possible right lower lung nodule as described with recommendation for follow-up. 4. Borderline probable reactive mediastinal nodes. 5. Goiter.       XR chest 1 view  Result Date: 4/20/2024  FINDINGS: No focal infiltrate. No apparent pleural effusion. Cardiomediastinal silhouette unchanged. Mild pulmonary vascular congestion.  Mild pulmonary vascular congestion.        Impression  Acute hypoxia  Acute COPD exacerbation  Right lower lobe lung nodule versus infiltrate  Tobacco use  Obesity    Plan  Stable on room air  Continue IBD/ICS  Insentive spirometry/pulmonary hygiene  Prednisone-discharge on taper  Continue Zosyn-discharge on oral  FEV1 as outpatient  Polysomnogram as outpatient  Smoking cessation  Prophylaxis  PT/OT/out of bed  Follow-up lung nodule with non-contrast CT scan chest in 3 monsths  Stable for discharge from a pulmonary standpoint after home oxygen certification  Follow-up with Dr. Larkin in 2 weeks with a chest x-ray and to discuss timing of CT scan chest    SAAD [facility]

## 2024-04-25 NOTE — DISCHARGE SUMMARY
Discharge Diagnosis  COPD exacerbation (Multi)    Issues Requiring Follow-Up  COPD    Discharge Meds     Your medication list        START taking these medications        Instructions Last Dose Given Next Dose Due   acetaminophen 325 mg tablet  Commonly known as: Tylenol      Take 2 tablets (650 mg) by mouth every 4 hours if needed for fever (temp greater than 38.0 C) (greater than or equal to 38 C).       albuterol 90 mcg/actuation inhaler  Commonly known as: ProAir HFA      Inhale 2 puffs every 4 hours if needed for wheezing or shortness of breath.       guaiFENesin 600 mg 12 hr tablet  Commonly known as: Mucinex      Take 1 tablet (600 mg) by mouth 2 times a day. Do not crush, chew, or split.       predniSONE 10 mg tablet  Commonly known as: Deltasone  Start taking on: April 25, 2024      Take 4 tablets (40 mg) by mouth once daily for 3 days, THEN 3 tablets (30 mg) once daily for 3 days, THEN 2 tablets (20 mg) once daily for 3 days, THEN 1 tablet (10 mg) once daily for 3 days.              CHANGE how you take these medications        Instructions Last Dose Given Next Dose Due   methotrexate 2.5 mg tablet  Commonly known as: Trexall  What changed:   how much to take  when to take this  additional instructions      TAKE 5 TABLETS BY MOUTH ON TWO CONSECUTIVE DAYS FOR A TOTAL OF 10 TABLETS WEEKLY.              CONTINUE taking these medications        Instructions Last Dose Given Next Dose Due   ascorbic acid (vitamin C) 500 mg capsule           calcium carbonate  mg (750 mg) chewable tablet  Commonly known as: Tums Extra Strength           fluticasone 50 mcg/actuation nasal spray  Commonly known as: Flonase      Administer 1 spray into each nostril 2 times a day. Shake gently. Before first use, prime pump. After use, clean tip and replace cap.       folic acid 1 mg tablet  Commonly known as: Folvite           hydroxychloroquine 200 mg tablet  Commonly known as: Plaquenil           ipratropium 42 mcg (0.06 %)  nasal spray  Commonly known as: Atrovent      Administer 1 spray into each nostril as needed at bedtime for rhinitis.       nabumetone 750 mg tablet  Commonly known as: Relafen      Take 1 tablet (750 mg) by mouth 2 times a day.       omeprazole 40 mg DR capsule  Commonly known as: PriLOSEC      TAKE 1 CAPSULE BY MOUTH ONCE DAILY IN THE MORNING. TAKE BEFORE MEALS. DO NOT CRUSH OR CHEW       traZODone 50 mg tablet  Commonly known as: Desyrel                     Where to Get Your Medications        These medications were sent to [facility]  [address], [address], [place] [address]      Hours: 9 AM to 6 PM Mon-Fri, 9 AM to 1 PM Sat Phone: [phone]   albuterol 90 mcg/actuation inhaler  guaiFENesin 600 mg 12 hr tablet  predniSONE 10 mg tablet       Information about where to get these medications is not yet available    Ask your nurse or doctor about these medications  acetaminophen 325 mg tablet         Test Results Pending At Discharge  Pending Labs       No current pending labs.            Hospital Course   Admitted for COPD exacerbation. Was treated with IV steroids and Abx. Evaluated by Pulmonology and to follow-up outpatient. Breathing has improved. Pt to continue Prednisone taper on discharge. Home oxygen study will be done and home O2 will be arranged if pt qualifies. Medically stable for discharge.         Pertinent Physical Exam At Time of Discharge  Physical Exam  HENT:      Head: Normocephalic and atraumatic.      Nose: Nose normal.      Mouth/Throat:      Mouth: Mucous membranes are moist.      Pharynx: Oropharynx is clear.   Eyes:      Extraocular Movements: Extraocular movements intact.      Pupils: Pupils are equal, round, and reactive to light.   Cardiovascular:      Rate and Rhythm: Normal rate and regular rhythm.      Pulses: Normal pulses.   Pulmonary:      Effort: Pulmonary effort is normal.      Breath sounds: Normal breath sounds.   Abdominal:      General: Abdomen is  flat. Bowel sounds are normal.      Palpations: Abdomen is soft.   Musculoskeletal:         General: Normal range of motion.   Skin:     General: Skin is warm and dry.      Capillary Refill: Capillary refill takes less than 2 seconds.   Neurological:      General: No focal deficit present.      Mental Status: He is oriented to person, place, and time.   Psychiatric:         Mood and Affect: Mood normal.         Outpatient Follow-Up  Future Appointments   Date Time Provider Department Center   2/25/2025 11:00 AM Hernandez Bishop MD [id number]         SAAD Tay-CNP

## 2024-04-25 NOTE — CARE PLAN
Problem: Respiratory  Goal: Clear secretions with interventions this shift  Outcome: Progressing  Goal: Verbalize decreased shortness of breath this shift  Outcome: Progressing

## 2024-04-25 NOTE — CARE PLAN
The patient's goals for the shift include  safety, and rest    The clinical goals for the shift include wean O2, IV antibiotics, safety, and promote rest      04/24/24 at 9:43 PM - Jessica Delacruz RN

## 2024-04-26 ENCOUNTER — TELEPHONE (OUTPATIENT)
Dept: PRIMARY CARE | Facility: CLINIC | Age: 69
End: 2024-04-26
Payer: MEDICARE

## 2024-04-26 NOTE — TELEPHONE ENCOUNTER
Transition of Care    Inpatient facility:  [facility]   Discharge diagnosis: COPD,PNA  Discharged to: Home  Discharge date: 04/25/2024  Initial Call date: 04/26/2024  Spoke with patient/caregiver: spouse                                                                     Do you need assistance  visits prior to your PCP visit: No  Home health care ordered: No  Have you been contacted by home care and have a start of care date: No  Are you taking medications as prescribed at discharge: Yes    Referral to [facility] Pharmacist: No  Patient advised to bring all medications to PCP follow-up appointment.  Patient advised to follow discharge instructions until provider follow-up.  TCM visit date: 05/08/2024    TCM provider visit with: Hernandez Bishop

## 2024-05-01 ENCOUNTER — TELEPHONE (OUTPATIENT)
Dept: INPATIENT UNIT | Facility: HOSPITAL | Age: 69
End: 2024-05-01
Payer: MEDICARE

## 2024-05-03 DIAGNOSIS — J44.1 COPD EXACERBATION (MULTI): Primary | ICD-10-CM

## 2024-05-08 ENCOUNTER — OFFICE VISIT (OUTPATIENT)
Dept: PRIMARY CARE | Facility: CLINIC | Age: 69
End: 2024-05-08
Payer: MEDICARE

## 2024-05-08 VITALS
WEIGHT: 210 LBS | HEART RATE: 61 BPM | SYSTOLIC BLOOD PRESSURE: 140 MMHG | BODY MASS INDEX: 32.89 KG/M2 | RESPIRATION RATE: 16 BRPM | DIASTOLIC BLOOD PRESSURE: 80 MMHG | OXYGEN SATURATION: 97 % | TEMPERATURE: 97.3 F

## 2024-05-08 DIAGNOSIS — J44.1 COPD EXACERBATION (MULTI): Primary | ICD-10-CM

## 2024-05-08 DIAGNOSIS — M06.9 RHEUMATOID ARTHRITIS, INVOLVING UNSPECIFIED SITE, UNSPECIFIED WHETHER RHEUMATOID FACTOR PRESENT (MULTI): ICD-10-CM

## 2024-05-08 PROCEDURE — 99495 TRANSJ CARE MGMT MOD F2F 14D: CPT | Performed by: FAMILY MEDICINE

## 2024-05-08 PROCEDURE — 1111F DSCHRG MED/CURRENT MED MERGE: CPT | Performed by: FAMILY MEDICINE

## 2024-05-08 PROCEDURE — 1123F ACP DISCUSS/DSCN MKR DOCD: CPT | Performed by: FAMILY MEDICINE

## 2024-05-08 PROCEDURE — 1160F RVW MEDS BY RX/DR IN RCRD: CPT | Performed by: FAMILY MEDICINE

## 2024-05-08 PROCEDURE — 1159F MED LIST DOCD IN RCRD: CPT | Performed by: FAMILY MEDICINE

## 2024-05-08 RX ORDER — METHOTREXATE 2.5 MG/1
TABLET ORAL
Start: 2024-05-12

## 2024-05-08 RX ORDER — IPRATROPIUM BROMIDE AND ALBUTEROL SULFATE 2.5; .5 MG/3ML; MG/3ML
3 SOLUTION RESPIRATORY (INHALATION) 4 TIMES DAILY PRN
Qty: 120 ML | Refills: 11 | Status: SHIPPED | OUTPATIENT
Start: 2024-05-08 | End: 2024-06-04 | Stop reason: SDUPTHER

## 2024-05-08 RX ORDER — TIOTROPIUM BROMIDE INHALATION SPRAY 3.12 UG/1
2 SPRAY, METERED RESPIRATORY (INHALATION) DAILY
Qty: 8 G | Refills: 11 | Status: SHIPPED | OUTPATIENT
Start: 2024-05-08 | End: 2025-05-08

## 2024-05-08 RX ORDER — NEBULIZER AND COMPRESSOR
EACH MISCELLANEOUS
Qty: 1 EACH | Refills: 0 | Status: SHIPPED | OUTPATIENT
Start: 2024-05-08 | End: 2024-06-04 | Stop reason: SDUPTHER

## 2024-05-08 NOTE — PROGRESS NOTES
"Subjective   Patient ID: Dipesh Rousseau is a 69 y.o. male who presents for Hospital Follow-up.    DC summary reviewed from 4/25/24.  TCM communication made 4/26/24.  Med list reconciled.  Completed last dose of pred taper yesterday.     Still with cough and rhonchi at night.       Last cigaretted 3 weeks ago.  Had weaned down to 3 cig/day over past year.     Objective   Visit Vitals  /80 (BP Location: Left arm, Patient Position: Sitting, BP Cuff Size: Adult)   Pulse 61   Temp 36.3 °C (97.3 °F) (Temporal)   Resp 16   Wt 95.3 kg (210 lb)   SpO2 97%   BMI 32.89 kg/m²   Smoking Status Every Day   BSA 2.12 m²      O: VSS AFEB Awake, Alert, NAD.  No intoxication, withdrawal, or sedation.  Chest CTA.  Heart RRR.  Ext no c/c/e.      Lab Results   Component Value Date    WBC 14.1 (H) 04/25/2024    HGB 12.5 (L) 04/25/2024    HCT 38.2 (L) 04/25/2024    MCV 92 04/25/2024     (L) 04/25/2024       Chemistry    Lab Results   Component Value Date/Time     04/25/2024 0447    K 4.2 04/25/2024 0447     04/25/2024 0447    CO2 25 04/25/2024 0447    BUN 18 04/25/2024 0447    CREATININE 0.90 04/25/2024 0447    Lab Results   Component Value Date/Time    CALCIUM 8.7 04/25/2024 0447    ALKPHOS 81 04/21/2024 0450    AST 41 (H) 04/21/2024 0450    ALT 35 04/21/2024 0450    BILITOT 0.4 04/21/2024 0450          Lab Results   Component Value Date    CHOL 176 05/09/2023     Lab Results   Component Value Date    HDL 62.4 05/09/2023     No results found for: \"LDLCALC\"  Lab Results   Component Value Date    TRIG 69 05/09/2023     No components found for: \"CHOLHDL\"   No results found for: \"HGBA1C\"    Assessment/Plan   TCM <14 days  Problem List Items Addressed This Visit       Rheumatoid arthritis (Multi)    Overview     Dx 2019  Failed Humira (LFTs)  Failed Orencia (Fatigue)  12/13/23 [facility] RHEUM ([facility])  Plaquenil (HQN),  methotrexate down to 15 mg weekly.  Avoid Siria's d/t hx smoking.  Tolerating Rituximab    10/13/23 halve " nabumetone due to gastritis/esophagitis         Relevant Medications    methotrexate (Trexall) 2.5 mg tablet (Start on 5/12/2024)    COPD exacerbation (Multi) - Primary    Overview     4/2024 Hospitalized [facility].         Current Assessment & Plan     Keep follow up with PULM Dr Larkin next week (5/14/24)    Order PFTs  Order Spiriva Respimat 1 inhalation daily.   Has albuterol hfa to use as needed.  Order home nebulizer machine + duoneb solution to use TID prn acute exacerbation.     Congratulated patient on quitting smoking 4/2024!!!         Relevant Medications    tiotropium (Spiriva Respimat) 2.5 mcg/actuation inhaler    nebulizer and compressor device    nebulizer accessories kit    ipratropium-albuteroL (Duo-Neb) 0.5-2.5 mg/3 mL nebulizer solution

## 2024-05-08 NOTE — ASSESSMENT & PLAN NOTE
Keep follow up with PULM Dr Larkin next week (5/14/24)    Order PFTs  Order Spiriva Respimat 1 inhalation daily.   Has albuterol hfa to use as needed.  Order home nebulizer machine + duoneb solution to use TID prn acute exacerbation.     Congratulated patient on quitting smoking 4/2024!!!

## 2024-05-08 NOTE — Clinical Note
Mohit -- seeing Mr Onelia for hospital follow up.  I tried to place PFTs in EPIC but couldn't get past a hard stop.   I did place an order for Spiriva respimat -- but didn't know if you wanted me to hold off on him taking this until you saw him for lung studies.  Let me know,  Thanks for seeing him.   Gamal GALLAGHER

## 2024-05-10 ENCOUNTER — TELEPHONE (OUTPATIENT)
Dept: PRIMARY CARE | Facility: CLINIC | Age: 69
End: 2024-05-10
Payer: MEDICARE

## 2024-05-10 NOTE — TELEPHONE ENCOUNTER
Monique from [facility] called stating a order for nebulizer machine with diagnosis code and copy of insurance info needs to [facility].    Fax:[phone]

## 2024-05-29 ENCOUNTER — HOSPITAL ENCOUNTER (OUTPATIENT)
Dept: RADIOLOGY | Facility: HOSPITAL | Age: 69
Discharge: HOME | End: 2024-05-29
Payer: MEDICARE

## 2024-05-29 DIAGNOSIS — R91.1 SOLITARY PULMONARY NODULE: ICD-10-CM

## 2024-05-29 PROCEDURE — 71250 CT THORAX DX C-: CPT

## 2024-05-29 PROCEDURE — 71250 CT THORAX DX C-: CPT | Performed by: RADIOLOGY

## 2024-06-04 DIAGNOSIS — J44.1 COPD EXACERBATION (MULTI): ICD-10-CM

## 2024-06-04 RX ORDER — NEBULIZER AND COMPRESSOR
EACH MISCELLANEOUS
Qty: 1 EACH | Refills: 0 | Status: SHIPPED | OUTPATIENT
Start: 2024-06-04

## 2024-06-04 RX ORDER — IPRATROPIUM BROMIDE AND ALBUTEROL SULFATE 2.5; .5 MG/3ML; MG/3ML
3 SOLUTION RESPIRATORY (INHALATION) 4 TIMES DAILY PRN
Qty: 120 ML | Refills: 11 | Status: SHIPPED | OUTPATIENT
Start: 2024-06-04 | End: 2025-06-04

## 2024-07-23 ENCOUNTER — OFFICE VISIT (OUTPATIENT)
Dept: PRIMARY CARE | Facility: CLINIC | Age: 69
End: 2024-07-23
Payer: MEDICARE

## 2024-07-23 VITALS
WEIGHT: 212 LBS | OXYGEN SATURATION: 95 % | DIASTOLIC BLOOD PRESSURE: 94 MMHG | SYSTOLIC BLOOD PRESSURE: 164 MMHG | TEMPERATURE: 98.1 F | BODY MASS INDEX: 33.27 KG/M2 | HEIGHT: 67 IN | HEART RATE: 57 BPM

## 2024-07-23 DIAGNOSIS — I10 PRIMARY HYPERTENSION: Primary | ICD-10-CM

## 2024-07-23 PROCEDURE — 99213 OFFICE O/P EST LOW 20 MIN: CPT | Performed by: INTERNAL MEDICINE

## 2024-07-23 PROCEDURE — 1123F ACP DISCUSS/DSCN MKR DOCD: CPT | Performed by: INTERNAL MEDICINE

## 2024-07-23 PROCEDURE — 3080F DIAST BP >= 90 MM HG: CPT | Performed by: INTERNAL MEDICINE

## 2024-07-23 PROCEDURE — 1159F MED LIST DOCD IN RCRD: CPT | Performed by: INTERNAL MEDICINE

## 2024-07-23 PROCEDURE — 3077F SYST BP >= 140 MM HG: CPT | Performed by: INTERNAL MEDICINE

## 2024-07-23 PROCEDURE — 3008F BODY MASS INDEX DOCD: CPT | Performed by: INTERNAL MEDICINE

## 2024-07-23 RX ORDER — AMLODIPINE BESYLATE 5 MG/1
5 TABLET ORAL DAILY
Qty: 30 TABLET | Refills: 5 | Status: SHIPPED | OUTPATIENT
Start: 2024-07-23 | End: 2025-01-19

## 2024-07-23 ASSESSMENT — ENCOUNTER SYMPTOMS
DIARRHEA: 0
DIFFICULTY URINATING: 0
BLOOD IN STOOL: 0
CHOKING: 0
BRUISES/BLEEDS EASILY: 0
NAUSEA: 0
FEVER: 0
BACK PAIN: 0
ACTIVITY CHANGE: 0
FLANK PAIN: 0
RHINORRHEA: 0
ABDOMINAL DISTENTION: 0
NERVOUS/ANXIOUS: 0
SEIZURES: 0
COUGH: 0
COLOR CHANGE: 0
VOMITING: 0
HALLUCINATIONS: 0
APPETITE CHANGE: 0
HEADACHES: 0
FACIAL ASYMMETRY: 0
FREQUENCY: 0
HEMATURIA: 0
DIZZINESS: 0
WHEEZING: 0
DYSPHORIC MOOD: 0
NUMBNESS: 0
VOICE CHANGE: 0
WEAKNESS: 0
ABDOMINAL PAIN: 0
DYSURIA: 0
POLYPHAGIA: 0
SINUS PRESSURE: 0
MYALGIAS: 0
PALPITATIONS: 0
SHORTNESS OF BREATH: 0
TROUBLE SWALLOWING: 0
CONFUSION: 0
CONSTIPATION: 0
SINUS PAIN: 0
NECK STIFFNESS: 0
SPEECH DIFFICULTY: 0
PHOTOPHOBIA: 0
POLYDIPSIA: 0
ARTHRALGIAS: 0
EYE REDNESS: 0
STRIDOR: 0
FATIGUE: 0
EYE DISCHARGE: 0
CHEST TIGHTNESS: 0

## 2024-07-23 NOTE — PROGRESS NOTES
"Subjective   Patient ID: Dipesh Rousseau is a 69 y.o. male who presents for Hypertension.    HPI   Patient presented to the office for the BP reading as high as 190/96. It was checked multiple time and it was significantly high.  Patient is not on any BP medication.    I reviewed old medical documentation and he has high blood pressure last year medical chart specifically from Feb- may/2023. It was not as high as now.  Patient has been discussed that he will going to need medicine.    Review of Systems   Constitutional:  Negative for activity change, appetite change, fatigue and fever.   HENT:  Negative for congestion, dental problem, ear pain, hearing loss, rhinorrhea, sinus pressure, sinus pain, trouble swallowing and voice change.    Eyes:  Negative for photophobia, discharge, redness and visual disturbance.   Respiratory:  Negative for cough, choking, chest tightness, shortness of breath, wheezing and stridor.    Cardiovascular:  Negative for chest pain, palpitations and leg swelling.   Gastrointestinal:  Negative for abdominal distention, abdominal pain, blood in stool, constipation, diarrhea, nausea and vomiting.   Endocrine: Negative for polydipsia, polyphagia and polyuria.   Genitourinary:  Negative for difficulty urinating, dysuria, flank pain, frequency, hematuria and urgency.   Musculoskeletal:  Negative for arthralgias, back pain, myalgias and neck stiffness.   Skin:  Negative for color change and rash.   Allergic/Immunologic: Negative for immunocompromised state.   Neurological:  Negative for dizziness, seizures, syncope, facial asymmetry, speech difficulty, weakness, numbness and headaches.   Hematological:  Does not bruise/bleed easily.   Psychiatric/Behavioral:  Negative for behavioral problems, confusion, dysphoric mood, hallucinations and suicidal ideas. The patient is not nervous/anxious.      Objective   BP (!) 164/94   Pulse 57   Temp 36.7 °C (98.1 °F)   Ht 1.702 m (5' 7\")   Wt 96.2 kg " (212 lb)   SpO2 95%   BMI 33.20 kg/m²     Physical Exam  Constitutional:       General: He is not in acute distress.     Appearance: He is not ill-appearing or toxic-appearing.   HENT:      Nose: Nose normal. No congestion.   Eyes:      Conjunctiva/sclera: Conjunctivae normal.   Cardiovascular:      Rate and Rhythm: Normal rate and regular rhythm.      Pulses: Normal pulses.      Heart sounds: No murmur heard.     No gallop.   Pulmonary:      Effort: Pulmonary effort is normal. No respiratory distress.      Breath sounds: Normal breath sounds. No stridor. No wheezing, rhonchi or rales.   Musculoskeletal:         General: Normal range of motion.      Cervical back: Normal range of motion.   Skin:     General: Skin is warm.   Neurological:      General: No focal deficit present.      Mental Status: He is alert and oriented to person, place, and time.   Psychiatric:         Mood and Affect: Mood normal.         Behavior: Behavior normal.       Assessment/Plan   Problem List Items Addressed This Visit    None  Visit Diagnoses       Primary hypertension    -  Primary    Relevant Medications    amLODIPine (Norvasc) 5 mg tablet        Benefit of controlling BP has been discussed and I also discussed if you decide to not do anything about it. Patient was hesitant to take the medicine but after counseling, he is more ok and willing to try the medicine.    UNTREATED/ UNCONTROLLED HIGH BP LEAD TO VARIOUS PROBLEMS WITH HIGH MORBIDITY AND MORTALITY. KIDNEY FAILURE/ STROKE/ HEART ATTACK/ CAD/ CAROTID ARTERY STENOSIS/ CARDIAC VALVULAR PROBLEM ETC

## 2024-08-07 ENCOUNTER — APPOINTMENT (OUTPATIENT)
Dept: PRIMARY CARE | Facility: CLINIC | Age: 69
End: 2024-08-07
Payer: MEDICARE

## 2024-08-07 VITALS
TEMPERATURE: 97.8 F | RESPIRATION RATE: 16 BRPM | DIASTOLIC BLOOD PRESSURE: 82 MMHG | BODY MASS INDEX: 33.11 KG/M2 | WEIGHT: 211.4 LBS | SYSTOLIC BLOOD PRESSURE: 140 MMHG | OXYGEN SATURATION: 99 % | HEART RATE: 62 BPM

## 2024-08-07 DIAGNOSIS — F17.210 NICOTINE DEPENDENCE, CIGARETTES, UNCOMPLICATED: ICD-10-CM

## 2024-08-07 DIAGNOSIS — I10 PRIMARY HYPERTENSION: ICD-10-CM

## 2024-08-07 DIAGNOSIS — J44.1 COPD EXACERBATION (MULTI): Primary | ICD-10-CM

## 2024-08-07 PROCEDURE — 99214 OFFICE O/P EST MOD 30 MIN: CPT | Performed by: FAMILY MEDICINE

## 2024-08-07 PROCEDURE — 3079F DIAST BP 80-89 MM HG: CPT | Performed by: FAMILY MEDICINE

## 2024-08-07 PROCEDURE — 1123F ACP DISCUSS/DSCN MKR DOCD: CPT | Performed by: FAMILY MEDICINE

## 2024-08-07 PROCEDURE — 3077F SYST BP >= 140 MM HG: CPT | Performed by: FAMILY MEDICINE

## 2024-08-07 RX ORDER — AMLODIPINE BESYLATE 10 MG/1
10 TABLET ORAL DAILY
Qty: 90 TABLET | Refills: 3 | Status: SHIPPED | OUTPATIENT
Start: 2024-08-07 | End: 2025-08-07

## 2024-08-07 NOTE — PROGRESS NOTES
"Subjective   Patient ID: Dipesh Rousseau is a 69 y.o. male who presents for Hypertension (3 week follow up from Dr cai starting on BP med ).  And recheck on COPD.   Tolerating amlodipine 5 mg daily started last month.     4/11/24 Renal CT ([facility]) Left SIMPLE  1.5 cm cyst.  4/20/24 CTA: no PE, bronchitis, ?focal pna? RLL nodule.  4/20/24 O2 88% on RA. Improved with 4LPM.   4/25/24 [facility]: COPD Exac. Pred Taper.  TCM appt made 4/26/24, for 5/8/24.  4/2024 [facility].   7/1/24 [facility] ([facility]): HCQ, methotrexate, nabumetone,  Rituximab infusions.    Objective   Visit Vitals  /82 (BP Location: Left arm, Patient Position: Sitting, BP Cuff Size: Adult)   Pulse 62   Temp 36.6 °C (97.8 °F) (Temporal)   Resp 16   Wt 95.9 kg (211 lb 6.4 oz)   SpO2 99%   BMI 33.11 kg/m²   Smoking Status Every Day   BSA 2.13 m²      O: VSS AFEB Awake, Alert, NAD.  No intoxication, withdrawal, or sedation.  Chest CTA.  Heart RRR.  Ext no c/c/e.      Labs reviewed -- 7/23/24 Normal CMP. Hepatitis panel NEG.  Stable mild anemia.     Lab Results   Component Value Date    WBC 14.1 (H) 04/25/2024    HGB 12.5 (L) 04/25/2024    HCT 38.2 (L) 04/25/2024    MCV 92 04/25/2024     (L) 04/25/2024       Chemistry    Lab Results   Component Value Date/Time     04/25/2024 0447    K 4.2 04/25/2024 0447     04/25/2024 0447    CO2 25 04/25/2024 0447    BUN 18 04/25/2024 0447    CREATININE 0.90 04/25/2024 0447    Lab Results   Component Value Date/Time    CALCIUM 8.7 04/25/2024 0447    ALKPHOS 81 04/21/2024 0450    AST 41 (H) 04/21/2024 0450    ALT 35 04/21/2024 0450    BILITOT 0.4 04/21/2024 0450          Lab Results   Component Value Date    CHOL 176 05/09/2023     Lab Results   Component Value Date    HDL 62.4 05/09/2023     No results found for: \"LDLCALC\"  Lab Results   Component Value Date    TRIG 69 05/09/2023     No components found for: \"CHOLHDL\"   No results found for: \"HGBA1C\"    Assessment/Plan   Problem List Items " Addressed This Visit       Primary hypertension    Overview     Onset 7/2024         Current Assessment & Plan     Tolerating 5 mg norvasc.  Increase to 10 mg norvasc.   Continue to check bp at home.           Nicotine dependence, cigarettes, uncomplicated    Overview     Quit 6/2024.    4/20/24 CTA: no PE, bronchitis, ?focal pna? RLL nodule.         COPD exacerbation (Multi) - Primary    Overview     4/2024 Hospitalized [facility].  5/14/24 PULM Dr Larkin          Current Assessment & Plan     Doing well.    Never started Spiriva Respimat 1 inhalation daily.   Has albuterol hfa to use as needed.  Has home nebulizer machine + duoneb solution to use TID prn acute exacerbation.     Congratulated patient on quitting smoking 4/2024!!!  Last cigarette 6/2024.

## 2024-08-07 NOTE — ASSESSMENT & PLAN NOTE
Doing well.    Never started Spiriva Respimat 1 inhalation daily.   Has albuterol hfa to use as needed.  Has home nebulizer machine + duoneb solution to use TID prn acute exacerbation.     Congratulated patient on quitting smoking 4/2024!!!  Last cigarette 6/2024.

## 2024-08-07 NOTE — ASSESSMENT & PLAN NOTE
4/12/24 Annual [facility] Preventative exam -- overweight -- counseled on [facility] weight loss tips of healthy diet cut 250 calories out per day and regular exercise (150 minutes a week). Colonoscopy clear 2022 -- next due 2032. Low Dose CT lung screen.   Due for Prevnar 20 4/12/24.    4/12/24 Annual medicare wellness eval performed:  Cognitive impairment stable, no depression, no alcohol use.  Screening tests up to date.         4/12/24 counseled on smoking cessation: Smoking <1/2 ppd -- currently pre-contemplative, wife is contemplative.     No regular alcohol use -- at most 2 beers a week.    4/12/24 I spent 15 minutes face-to-face with this individual discussing their cardiovascular risk and behavioral therapies of nutritional choices, exercise, and elimination of habits contributing to risk.  We agreed on a plan addressing reduction of their current cardiovascular risk.  Patient's 10 year CV risk estimate calculates to:  17.2 % ()  Declines statin.     Insomnia -- c/o early morning awakening. RLS seems to have resolved. no better with trial of flexeril. If no better or if breathing becomes more of an issue can send for sleep study.     4/20/24 CTA: no PE, bronchitis, ?focal pna? RLL nodule.

## 2024-08-28 ENCOUNTER — APPOINTMENT (OUTPATIENT)
Dept: PRIMARY CARE | Facility: CLINIC | Age: 69
End: 2024-08-28
Payer: MEDICARE

## 2024-12-26 DIAGNOSIS — K21.00 GASTROESOPHAGEAL REFLUX DISEASE WITH ESOPHAGITIS WITHOUT HEMORRHAGE: ICD-10-CM

## 2024-12-27 RX ORDER — OMEPRAZOLE 40 MG/1
40 CAPSULE, DELAYED RELEASE ORAL
Qty: 90 CAPSULE | Refills: 3 | Status: SHIPPED | OUTPATIENT
Start: 2024-12-27

## 2025-02-25 ENCOUNTER — APPOINTMENT (OUTPATIENT)
Dept: PRIMARY CARE | Facility: CLINIC | Age: 70
End: 2025-02-25
Payer: MEDICARE

## 2025-02-25 VITALS
WEIGHT: 218.8 LBS | TEMPERATURE: 98.2 F | OXYGEN SATURATION: 95 % | DIASTOLIC BLOOD PRESSURE: 88 MMHG | SYSTOLIC BLOOD PRESSURE: 136 MMHG | BODY MASS INDEX: 34.27 KG/M2 | HEART RATE: 59 BPM

## 2025-02-25 DIAGNOSIS — C61 PROSTATE CANCER (MULTI): ICD-10-CM

## 2025-02-25 DIAGNOSIS — I10 PRIMARY HYPERTENSION: ICD-10-CM

## 2025-02-25 DIAGNOSIS — G56.01 RIGHT CARPAL TUNNEL SYNDROME: ICD-10-CM

## 2025-02-25 DIAGNOSIS — M47.816 LUMBAR SPONDYLOSIS: ICD-10-CM

## 2025-02-25 DIAGNOSIS — Z00.00 MEDICARE ANNUAL WELLNESS VISIT, SUBSEQUENT: ICD-10-CM

## 2025-02-25 DIAGNOSIS — J44.1 COPD EXACERBATION (MULTI): Primary | ICD-10-CM

## 2025-02-25 PROCEDURE — 1126F AMNT PAIN NOTED NONE PRSNT: CPT | Performed by: FAMILY MEDICINE

## 2025-02-25 PROCEDURE — 99214 OFFICE O/P EST MOD 30 MIN: CPT | Performed by: FAMILY MEDICINE

## 2025-02-25 PROCEDURE — 3079F DIAST BP 80-89 MM HG: CPT | Performed by: FAMILY MEDICINE

## 2025-02-25 PROCEDURE — G0446 INTENS BEHAVE THER CARDIO DX: HCPCS | Performed by: FAMILY MEDICINE

## 2025-02-25 PROCEDURE — 1159F MED LIST DOCD IN RCRD: CPT | Performed by: FAMILY MEDICINE

## 2025-02-25 PROCEDURE — 99406 BEHAV CHNG SMOKING 3-10 MIN: CPT | Performed by: FAMILY MEDICINE

## 2025-02-25 PROCEDURE — G0439 PPPS, SUBSEQ VISIT: HCPCS | Performed by: FAMILY MEDICINE

## 2025-02-25 PROCEDURE — 3075F SYST BP GE 130 - 139MM HG: CPT | Performed by: FAMILY MEDICINE

## 2025-02-25 PROCEDURE — 99397 PER PM REEVAL EST PAT 65+ YR: CPT | Performed by: FAMILY MEDICINE

## 2025-02-25 PROCEDURE — 1123F ACP DISCUSS/DSCN MKR DOCD: CPT | Performed by: FAMILY MEDICINE

## 2025-02-25 ASSESSMENT — ENCOUNTER SYMPTOMS
WEAKNESS: 0
EYE PAIN: 0
ABDOMINAL PAIN: 0
NECK STIFFNESS: 0
HEADACHES: 0
SORE THROAT: 0
PALPITATIONS: 0
ADENOPATHY: 0
BLOOD IN STOOL: 0
WOUND: 0
POLYDIPSIA: 0
RHINORRHEA: 0
EYE REDNESS: 0
COUGH: 0
HALLUCINATIONS: 0
CHILLS: 0
DYSURIA: 0
BRUISES/BLEEDS EASILY: 0
FEVER: 0
HEMATURIA: 0
FATIGUE: 0
SHORTNESS OF BREATH: 1
BACK PAIN: 1

## 2025-02-25 ASSESSMENT — PATIENT HEALTH QUESTIONNAIRE - PHQ9
SUM OF ALL RESPONSES TO PHQ9 QUESTIONS 1 AND 2: 0
1. LITTLE INTEREST OR PLEASURE IN DOING THINGS: NOT AT ALL
2. FEELING DOWN, DEPRESSED OR HOPELESS: NOT AT ALL

## 2025-02-25 ASSESSMENT — PAIN SCALES - GENERAL: PAINLEVEL_OUTOF10: 0-NO PAIN

## 2025-02-25 NOTE — ASSESSMENT & PLAN NOTE
2/25/25  Annual [facility] Preventative exam -- overweight -- counseled on [facility] weight loss tips of healthy diet cut 250 calories out per day and regular exercise (150 minutes a week). Colonoscopy clear 2022 -- next due 2032. Low Dose CT lung screen.   Due for Prevnar 20 4/12/24.    2/25/25 Annual medicare wellness eval performed:  Cognitive impairment stable, no depression, no alcohol use.  Screening tests up to date.       2/25/25 counseled on smoking cessation -- 3 minutes.  Downt to 4-5 cig/week.    2/25/25 I spent 15 minutes face-to-face with this individual discussing their cardiovascular risk and behavioral therapies of nutritional choices, exercise, and elimination of habits contributing to risk.  We agreed on a plan addressing reduction of their current cardiovascular risk.  Patient's 10 year CV risk estimate calculates to:  17.2 % ()  Declines statin.     Insomnia -- c/o early morning awakening. RLS seems to have resolved. no better with trial of flexeril. If no better or if breathing becomes more of an issue can send for sleep study.     4/20/24 CTA: no PE, bronchitis, ?focal pna? RLL nodule.

## 2025-02-25 NOTE — PROGRESS NOTES
Subjective   Reason for Visit: Dipesh Rousseau is an 70 y.o. male here for a Medicare Wellness visit.   MM preventive exam.      C/O gurgling in throat.    CXR negative.   Considering     Cut back to 4-5 cig/week.         Reviewed all medications by prescribing practitioner or clinical pharmacist (such as prescriptions, OTCs, herbal therapies and supplements) and documented in the medical record.        Patient Care Team:  Hernandez Bishop MD as PCP - General (Family Medicine)     Review of Systems   Constitutional:  Negative for chills, fatigue and fever.   HENT:  Negative for rhinorrhea and sore throat.    Eyes:  Negative for pain and redness.   Respiratory:  Positive for shortness of breath. Negative for cough.    Cardiovascular:  Negative for chest pain and palpitations.   Gastrointestinal:  Negative for abdominal pain and blood in stool.   Endocrine: Negative for polydipsia and polyuria.   Genitourinary:  Negative for dysuria and hematuria.   Musculoskeletal:  Positive for back pain. Negative for neck stiffness.   Skin:  Negative for rash and wound.   Allergic/Immunologic: Negative for environmental allergies and food allergies.   Neurological:  Negative for weakness and headaches.   Hematological:  Negative for adenopathy. Does not bruise/bleed easily.   Psychiatric/Behavioral:  Negative for hallucinations and suicidal ideas.        Objective   Vitals:  /88   Pulse 59   Temp 36.8 °C (98.2 °F)   Wt 99.2 kg (218 lb 12.8 oz)   SpO2 95%   BMI 34.27 kg/m²       Physical Exam  Vitals reviewed.   Constitutional:       General: He is not in acute distress.     Appearance: He is not ill-appearing.   HENT:      Head: Normocephalic and atraumatic.      Right Ear: Tympanic membrane normal.      Left Ear: Tympanic membrane normal.      Nose: No congestion or rhinorrhea.      Mouth/Throat:      Pharynx: No oropharyngeal exudate or posterior oropharyngeal erythema.   Eyes:      Extraocular Movements:  Extraocular movements intact.      Conjunctiva/sclera: Conjunctivae normal.      Pupils: Pupils are equal, round, and reactive to light.   Cardiovascular:      Rate and Rhythm: Normal rate and regular rhythm.      Heart sounds: No murmur heard.     No friction rub. No gallop.   Pulmonary:      Effort: Pulmonary effort is normal.      Breath sounds: Normal breath sounds. No wheezing, rhonchi or rales.   Abdominal:      General: There is no distension.      Palpations: Abdomen is soft.      Tenderness: There is no abdominal tenderness. There is no guarding or rebound.   Musculoskeletal:         General: No swelling or deformity.      Cervical back: Normal range of motion and neck supple.      Right lower leg: No edema.      Left lower leg: No edema.   Skin:     Capillary Refill: Capillary refill takes less than 2 seconds.      Coloration: Skin is not jaundiced.      Findings: No rash.   Neurological:      General: No focal deficit present.      Mental Status: He is alert.      Motor: No weakness.   Psychiatric:         Mood and Affect: Mood normal.         Behavior: Behavior normal.       Assessment/Plan   Problem List Items Addressed This Visit       Primary hypertension    Overview     Onset 7/2024         Current Assessment & Plan     Stable on 10 mg norvasc.   Continue to check bp at home.           Prostate cancer (Multi)    Overview     Hx prostate ca s/p prostatectomy 2012 per Dr High -- annual psa.          Current Assessment & Plan     Psa zero.         Medicare annual wellness visit, subsequent    Overview     2/25/25 Annual MM preventive exam performed  2/25/25  subsequent medicare wellness eval performed           Current Assessment & Plan     2/25/25  Annual MM Preventative exam -- overweight -- counseled on [facility] weight loss tips of healthy diet cut 250 calories out per day and regular exercise (150 minutes a week). Colonoscopy clear 2022 -- next due 2032. Low Dose CT lung screen.   Due for Prevnar 20  4/12/24.    2/25/25 Annual medicare wellness eval performed:  Cognitive impairment stable, no depression, no alcohol use.  Screening tests up to date.       2/25/25 counseled on smoking cessation -- 3 minutes.  Downt to 4-5 cig/week.    2/25/25 I spent 15 minutes face-to-face with this individual discussing their cardiovascular risk and behavioral therapies of nutritional choices, exercise, and elimination of habits contributing to risk.  We agreed on a plan addressing reduction of their current cardiovascular risk.  Patient's 10 year CV risk estimate calculates to:  17.2 % ()  Declines statin.     Insomnia -- c/o early morning awakening. RLS seems to have resolved. no better with trial of flexeril. If no better or if breathing becomes more of an issue can send for sleep study.     4/20/24 CTA: no PE, bronchitis, ?focal pna? RLL nodule.         COPD exacerbation (Multi) - Primary    Overview     4/2024 Hospitalized [facility].    5/2025 PULM Dr Johnson pending         Current Assessment & Plan     Doing well.    Never started Spiriva Respimat 1 inhalation daily -- not covered.   Has albuterol hfa to use as needed.  Has home nebulizer machine + duoneb solution to use at LEAST TID, up to QID if he can tolerate.    Congratulated patient on quitting smoking 4/2024!!!  Last cigarette 6/2024. -- back to 4-5 cig/week.          Lumbar spondylosis    Current Assessment & Plan     Check xrays.  Start physician directed HEP 2/25/25  Refer to Dr Gallagher  Offer MRI if no improvement in 6 weeks.          Relevant Orders    Referral to Pain Medicine    XR lumbar spine 4+ views w flexion extension    Right carpal tunnel syndrome    Overview     3/7/25 CTR -- right pending per Dr Kwan at [facility]         Current Assessment & Plan     Moderate pulmonary risk (COPD) for low risk orthopedic procedure with conscious sedation.     Medically clear.

## 2025-02-25 NOTE — ASSESSMENT & PLAN NOTE
Moderate pulmonary risk (COPD) for low risk orthopedic procedure with conscious sedation.     Medically clear.

## 2025-02-25 NOTE — ASSESSMENT & PLAN NOTE
Check xrays.  Start physician directed HEP 2/25/25  Refer to Dr Gallagher  Offer MRI if no improvement in 6 weeks.

## 2025-02-25 NOTE — ASSESSMENT & PLAN NOTE
Doing well.    Never started Spiriva Respimat 1 inhalation daily -- not covered.   Has albuterol hfa to use as needed.  Has home nebulizer machine + duoneb solution to use at LEAST TID, up to QID if he can tolerate.    Congratulated patient on quitting smoking 4/2024!!!  Last cigarette 6/2024. -- back to 4-5 cig/week.

## 2025-03-03 DIAGNOSIS — J44.1 COPD EXACERBATION (MULTI): ICD-10-CM

## 2025-03-03 RX ORDER — IPRATROPIUM BROMIDE AND ALBUTEROL SULFATE 2.5; .5 MG/3ML; MG/3ML
3 SOLUTION RESPIRATORY (INHALATION) 4 TIMES DAILY PRN
Qty: 120 ML | Refills: 11 | Status: SHIPPED | OUTPATIENT
Start: 2025-03-03 | End: 2026-03-03

## 2025-03-12 ENCOUNTER — HOSPITAL ENCOUNTER (OUTPATIENT)
Dept: RADIOLOGY | Facility: CLINIC | Age: 70
Discharge: HOME | End: 2025-03-12
Payer: MEDICARE

## 2025-03-12 DIAGNOSIS — M47.816 LUMBAR SPONDYLOSIS: ICD-10-CM

## 2025-03-12 PROCEDURE — 72120 X-RAY BEND ONLY L-S SPINE: CPT

## 2025-03-19 ENCOUNTER — APPOINTMENT (OUTPATIENT)
Dept: PAIN MEDICINE | Facility: CLINIC | Age: 70
End: 2025-03-19
Payer: MEDICARE

## 2025-07-21 DIAGNOSIS — I10 PRIMARY HYPERTENSION: ICD-10-CM

## 2025-07-21 RX ORDER — AMLODIPINE BESYLATE 10 MG/1
10 TABLET ORAL DAILY
Qty: 90 TABLET | Refills: 3 | Status: SHIPPED | OUTPATIENT
Start: 2025-07-21

## 2026-02-25 ENCOUNTER — APPOINTMENT (OUTPATIENT)
Dept: PRIMARY CARE | Facility: CLINIC | Age: 71
End: 2026-02-25
Payer: MEDICARE